# Patient Record
Sex: FEMALE | Race: WHITE | NOT HISPANIC OR LATINO | URBAN - METROPOLITAN AREA
[De-identification: names, ages, dates, MRNs, and addresses within clinical notes are randomized per-mention and may not be internally consistent; named-entity substitution may affect disease eponyms.]

---

## 2018-02-15 ENCOUNTER — EMERGENCY (EMERGENCY)
Facility: HOSPITAL | Age: 28
LOS: 1 days | Discharge: HOME | End: 2018-02-15
Attending: EMERGENCY MEDICINE | Admitting: EMERGENCY MEDICINE
Payer: COMMERCIAL

## 2018-02-15 VITALS
RESPIRATION RATE: 16 BRPM | HEART RATE: 73 BPM | SYSTOLIC BLOOD PRESSURE: 116 MMHG | OXYGEN SATURATION: 100 % | DIASTOLIC BLOOD PRESSURE: 77 MMHG

## 2018-02-15 VITALS
RESPIRATION RATE: 20 BRPM | WEIGHT: 140.21 LBS | SYSTOLIC BLOOD PRESSURE: 121 MMHG | HEART RATE: 68 BPM | DIASTOLIC BLOOD PRESSURE: 74 MMHG | TEMPERATURE: 99 F

## 2018-02-15 DIAGNOSIS — Z98.89 OTHER SPECIFIED POSTPROCEDURAL STATES: Chronic | ICD-10-CM

## 2018-02-15 PROCEDURE — 93005 ELECTROCARDIOGRAM TRACING: CPT

## 2018-02-15 PROCEDURE — 93010 ELECTROCARDIOGRAM REPORT: CPT

## 2018-02-15 PROCEDURE — 99283 EMERGENCY DEPT VISIT LOW MDM: CPT | Mod: 25

## 2018-02-15 PROCEDURE — 99284 EMERGENCY DEPT VISIT MOD MDM: CPT | Mod: 25

## 2018-02-15 NOTE — ED PROVIDER NOTE - PSYCHIATRIC, MLM
Alert and oriented to person, place, time/situation. Somewhat anxious. No apparent risk to self or others.

## 2018-02-15 NOTE — ED ADULT NURSE NOTE - OBJECTIVE STATEMENT
27y f pt c/o chest pain x 2 days; gradual onset; started while at rest; pt denies trauma; denies smoking; denies long trip; no birth control; pt states nothing makes it worse or better; comes and gos; starts with palpitations; pt states this happens when she gets anxious; pt has not been dx with anxiety but feels that she does get anxious and these symptoms start; pt denies family hx of early cardiac trouble; aox3; no resp distress; + chest pain to center of chest and then relieved by itself; no abd pain; no n/v/d; no fever/chills; no cough/congestion; pt states started exercising this month; no weight baring, running and yoga; skin warm dry intact; no periph edema; pt reports some sob; md harden at bedside

## 2018-02-15 NOTE — ED ADULT TRIAGE NOTE - CHIEF COMPLAINT QUOTE
Sent from urgent care for mid chest discomfort radiating to the back since last week. Pt has a h/o anxiety.

## 2018-02-15 NOTE — ED PROVIDER NOTE - MUSCULOSKELETAL, MLM
Spine appears normal, range of motion is not limited, no muscle or joint tenderness. No calf asymmetry, tenderness or palpable cord.

## 2018-02-15 NOTE — ED PROVIDER NOTE - MEDICAL DECISION MAKING DETAILS
Patient referred to the ED from the [facility] [place], [place] because of her c/o CP. Her complaint(s) are appreciated. Her EKG from the [facility] [place] [place] is unremarkable and our EKG reveals no dynamic change. Her physical examination is benign. She has no prior clot and does not take OCPs. There is no family history of premature cardiac death. There is nothing clinically evident to suggest any emergent process; i.e., ACS, Ao catastrophe, PTX, PE, complicated percy/myocarditis, etc. Not in need of any additional emergent diagnostic investigation or intervention at this time. Discharged with appropriate instruction and with a PCP with whom she can follow.

## 2018-02-15 NOTE — ED PROVIDER NOTE - OBJECTIVE STATEMENT
26 y/o female presenting with CP of 1 day's duration. It started gradually at rest, has been constant, midsternal, radiating to her back, described as a tightness, 8/10, without clear relieving or exacerbating factors and a/w palpitations. She also on ROSs questioning reports headache, dizziness, shortness of breath and numbness of her left arm.

## 2018-11-19 NOTE — ED ADULT NURSE NOTE - DOES PATIENT HAVE ADVANCE DIRECTIVE
Diagnosis: Non-small cell carcinoma of the lung-metastatic    Regimen: Carboplatin/Taxol  Cycle/Day: C1/D15    Dr. Allen Carlson is supervising clinician today.    ECO - Fully active, able to carry on all predisease activities without restrictions.    Nursing Assessment:   A focused nursing assessment addressing the toxicity of chemotherapy was performed and the patient reports the following:  Nausea: NO  Vomiting: NO  Fever: NO  Chills: NO  Other signs of infection: NO  Bleeding: NO  Mucositis: NO  Diarrhea: NO  Constipation: YES, pt reports small, hard stools.  Last BM yesterday.  States she started taking Miralax and has increased fluid intake.  Anorexia: NO  Dysuria: NO  Urinary Bleeding: NO  Cough: NO  Shortness of Breath: NO  Fatigue/Weakness: YES, generalized fatigue  Numbness/Tingling: NO  Other Neuropathies: NO  Edema: NO  Rash: NO  Hand/Foot Syndrome: NO  Anxiety/Depression/Insomnia: NO  Pain: NO    Patient confirms that she has received a copy of Chemotherapy Consent and verbalizes understanding of treatment plan: Yes.     Pre-Treatment: - Treatment consent signed  - Patient has valid pre-authorization  - VS completed  - BSA double checked  - Premed orders, including hydration, are verified prior to administration  - Treatment parameters verified in patient protocol  - Lab results checked - MD notified; labs within treatment parameters and appropriate for treatment today  - Chemotherapy doses independently doubled checked & verified by two practitioners  - Chemotherapy infusion pump settings are double checked & verified by two practitioners  - Patient is identified by first & last name, Date of birth that has been verified by two practitioners with the patient chairside.    Treatment: Refer to LDA and MAR for line assessment and medication administration  Refer to Toxicity Assessment doc flowsheet   Blood return confirmed before, during and after chemotherapy administered  Infusion pump used for  non-vesicant drugs     Central Line Type: Port  Central Line Site: Left  Port cleansed with ChloraPrep per protocol.  Accessed via: 20 gauge Britton needle  Patency Verification: Blood return greater or equal to 3 ml before, during and after treatment  Port flushed with: 20 ml 0.9 normal saline and 100 un/ml- 500 units heparin  Britton needle removed: Yes  Deaccess/site appearance: Clear (no redness, tenderness, or edema), dressing applied if required  Discharged: Stable  Comment: port accessed and labs drawn by phlebotomist at 1120 today      Post Treatment: Treatment tolerated well; no adverse reaction    Transfusion: Not needed    Integrative Medicine: No    Oral Chemotherapy: No    Education: No new instructions needed    Next appointment scheduled:   Future Appointments   Date Time Provider Department Center   11/26/2018 10:45 AM [place] [address] LAB [address]   11/26/2018 11:00 AM [facility]   12/3/2018 10:00 AM [facility] RADIATION THERAPY GENERIC RESOURCE [id number]   8/15/2019  8:30 AM Lakisha Stone MD [facility]     Patient instructed to call the office with any questions or concerns.    Patient Discharged: patient discharged to home per self, ambulatory     No

## 2019-08-30 ENCOUNTER — TRANSCRIPTION ENCOUNTER (OUTPATIENT)
Age: 29
End: 2019-08-30

## 2022-12-21 PROBLEM — G47.30 SLEEP APNEA, UNSPECIFIED: Chronic | Status: ACTIVE | Noted: 2018-02-15

## 2023-01-09 ENCOUNTER — APPOINTMENT (OUTPATIENT)
Dept: OBGYN | Facility: CLINIC | Age: 33
End: 2023-01-09
Payer: COMMERCIAL

## 2023-01-09 VITALS
HEART RATE: 82 BPM | OXYGEN SATURATION: 99 % | HEIGHT: 64 IN | DIASTOLIC BLOOD PRESSURE: 73 MMHG | RESPIRATION RATE: 16 BRPM | SYSTOLIC BLOOD PRESSURE: 109 MMHG | BODY MASS INDEX: 25.27 KG/M2 | WEIGHT: 148 LBS

## 2023-01-09 DIAGNOSIS — Z11.59 ENCOUNTER FOR SCREENING FOR OTHER VIRAL DISEASES: ICD-10-CM

## 2023-01-09 DIAGNOSIS — Z01.84 ENCOUNTER FOR ANTIBODY RESPONSE EXAMINATION: ICD-10-CM

## 2023-01-09 DIAGNOSIS — E55.9 VITAMIN D DEFICIENCY, UNSPECIFIED: ICD-10-CM

## 2023-01-09 DIAGNOSIS — Z13.88 ENCOUNTER FOR SCREENING FOR DISORDER DUE TO EXPOSURE TO CONTAMINANTS: ICD-10-CM

## 2023-01-09 DIAGNOSIS — N91.1 SECONDARY AMENORRHEA: ICD-10-CM

## 2023-01-09 DIAGNOSIS — R11.0 NAUSEA: ICD-10-CM

## 2023-01-09 DIAGNOSIS — Z13.29 ENCOUNTER FOR SCREENING FOR OTHER SUSPECTED ENDOCRINE DISORDER: ICD-10-CM

## 2023-01-09 DIAGNOSIS — D64.9 ANEMIA, UNSPECIFIED: ICD-10-CM

## 2023-01-09 DIAGNOSIS — Z11.3 ENCOUNTER FOR SCREENING FOR INFECTIONS WITH A PREDOMINANTLY SEXUAL MODE OF TRANSMISSION: ICD-10-CM

## 2023-01-09 DIAGNOSIS — Z01.83 ENCOUNTER FOR BLOOD TYPING: ICD-10-CM

## 2023-01-09 PROCEDURE — 36415 COLL VENOUS BLD VENIPUNCTURE: CPT

## 2023-01-09 PROCEDURE — 76817 TRANSVAGINAL US OBSTETRIC: CPT

## 2023-01-09 PROCEDURE — 99214 OFFICE O/P EST MOD 30 MIN: CPT

## 2023-01-09 NOTE — PLAN
[FreeTextEntry1] : TV sono with +IUP, CRL 6.6 weeks, +FH (1 week earlier than dates which would make EDC 8/27/23), c/w pt's 35 day cycle.\par -LOC simple noted approx 2.5cm\par -ob panel/UCx/pap/HPV/GC/Carrier screen today\par -Hx of C/S 7 years ago (different FOB)\par -Desires repeat C/S\par -pregnancy basics discussed, questions answered.\par \par RTO 3-4 weeks for NOB\par \par

## 2023-01-09 NOTE — HISTORY OF PRESENT ILLNESS
[FreeTextEntry1] : 33yo  presents for establishment of care, LMP 22 c/o amenorrhea, desires pregnancy. + nausea.\par \par Pt. is RN Med Surg, [employer] pt. transport [facility]\par \par AKADELINA Arnold in old emr\par \par Allergies: Eggplant\par \par Current Meds: none\par \par GYN: \par -accepts blood products\par -age of 1st intercourse 17\par -Male partner\par -s/p gardasil series\par -Hx of ovarian cysts\par -regular cycles 35 days x 1 week\par \par OB Hx\par -TOP x 1 \par -C/S 2015 42 weeks\par \par Med Hx: none\par \par Surgical Hx:\par -C/S x 1\par \par Family Hx: none\par \par Social Hx: -tobacco, -red drugs, -alcohol\par \par  [PapSmeardate] : 6/2020 [TextBox_31] : wnl

## 2023-01-11 LAB
ABO + RH PNL BLD: NORMAL
BACTERIA UR CULT: ABNORMAL
BASOPHILS # BLD AUTO: 0.03 K/UL
BASOPHILS NFR BLD AUTO: 0.3 %
BLD GP AB SCN SERPL QL: NORMAL
C TRACH RRNA SPEC QL NAA+PROBE: NOT DETECTED
EOSINOPHIL # BLD AUTO: 0.03 K/UL
EOSINOPHIL NFR BLD AUTO: 0.3 %
ESTIMATED AVERAGE GLUCOSE: 91 MG/DL
HBA1C MFR BLD HPLC: 4.8 %
HBV SURFACE AG SER QL: NONREACTIVE
HCT VFR BLD CALC: 38.3 %
HCV AB SER QL: NONREACTIVE
HCV S/CO RATIO: 0.08 S/CO
HGB A MFR BLD: 96.9 %
HGB A2 MFR BLD: 2.5 %
HGB BLD-MCNC: 13 G/DL
HGB F MFR BLD: 0.6 %
HGB FRACT BLD-IMP: NORMAL
HIV1+2 AB SPEC QL IA.RAPID: NONREACTIVE
HPV HIGH+LOW RISK DNA PNL CVX: NOT DETECTED
IMM GRANULOCYTES NFR BLD AUTO: 0.3 %
LEAD BLD-MCNC: <1 UG/DL
LYMPHOCYTES # BLD AUTO: 1.4 K/UL
LYMPHOCYTES NFR BLD AUTO: 14.2 %
MAN DIFF?: NORMAL
MCHC RBC-ENTMCNC: 30.8 PG
MCHC RBC-ENTMCNC: 33.9 GM/DL
MCV RBC AUTO: 90.8 FL
MEV IGG FLD QL IA: 43.9 AU/ML
MEV IGG+IGM SER-IMP: POSITIVE
MONOCYTES # BLD AUTO: 0.57 K/UL
MONOCYTES NFR BLD AUTO: 5.8 %
MUV AB SER-ACNC: POSITIVE
MUV IGG SER QL IA: 92.9 AU/ML
N GONORRHOEA RRNA SPEC QL NAA+PROBE: NOT DETECTED
NEUTROPHILS # BLD AUTO: 7.77 K/UL
NEUTROPHILS NFR BLD AUTO: 79.1 %
PLATELET # BLD AUTO: 237 K/UL
RBC # BLD: 4.22 M/UL
RBC # FLD: 12.2 %
RUBV IGG FLD-ACNC: 2 INDEX
RUBV IGG SER-IMP: POSITIVE
SOURCE AMPLIFICATION: NORMAL
T PALLIDUM AB SER QL IA: NEGATIVE
TSH SERPL-ACNC: 1.17 UIU/ML
VZV AB TITR SER: NEGATIVE
VZV IGG SER IF-ACNC: 56.2 INDEX
WBC # FLD AUTO: 9.83 K/UL

## 2023-01-11 RX ORDER — AMPICILLIN 500 MG/1
500 CAPSULE ORAL
Qty: 40 | Refills: 0 | Status: ACTIVE | COMMUNITY
Start: 2023-01-11 | End: 1900-01-01

## 2023-01-12 ENCOUNTER — NON-APPOINTMENT (OUTPATIENT)
Age: 33
End: 2023-01-12

## 2023-01-14 LAB
B19V IGG SER QL IA: 8.82 INDEX
B19V IGG+IGM SER-IMP: NORMAL
B19V IGG+IGM SER-IMP: POSITIVE
B19V IGM FLD-ACNC: 0.22 INDEX
B19V IGM SER-ACNC: NEGATIVE
CYTOLOGY CVX/VAG DOC THIN PREP: ABNORMAL

## 2023-02-01 ENCOUNTER — APPOINTMENT (OUTPATIENT)
Dept: OBGYN | Facility: CLINIC | Age: 33
End: 2023-02-01
Payer: COMMERCIAL

## 2023-02-01 PROCEDURE — 0500F INITIAL PRENATAL CARE VISIT: CPT

## 2023-02-01 PROCEDURE — 76817 TRANSVAGINAL US OBSTETRIC: CPT

## 2023-02-15 ENCOUNTER — TRANSCRIPTION ENCOUNTER (OUTPATIENT)
Age: 33
End: 2023-02-15

## 2023-02-17 ENCOUNTER — NON-APPOINTMENT (OUTPATIENT)
Age: 33
End: 2023-02-17

## 2023-02-17 ENCOUNTER — APPOINTMENT (OUTPATIENT)
Dept: OBGYN | Facility: CLINIC | Age: 33
End: 2023-02-17
Payer: COMMERCIAL

## 2023-02-17 ENCOUNTER — ASOB RESULT (OUTPATIENT)
Age: 33
End: 2023-02-17

## 2023-02-17 PROCEDURE — 76813 OB US NUCHAL MEAS 1 GEST: CPT

## 2023-02-17 PROCEDURE — 0502F SUBSEQUENT PRENATAL CARE: CPT

## 2023-02-17 PROCEDURE — 76801 OB US < 14 WKS SINGLE FETUS: CPT

## 2023-03-06 ENCOUNTER — TRANSCRIPTION ENCOUNTER (OUTPATIENT)
Age: 33
End: 2023-03-06

## 2023-03-17 ENCOUNTER — NON-APPOINTMENT (OUTPATIENT)
Age: 33
End: 2023-03-17

## 2023-03-17 ENCOUNTER — APPOINTMENT (OUTPATIENT)
Dept: OBGYN | Facility: CLINIC | Age: 33
End: 2023-03-17
Payer: COMMERCIAL

## 2023-03-17 PROCEDURE — 99213 OFFICE O/P EST LOW 20 MIN: CPT

## 2023-03-17 PROCEDURE — 0502F SUBSEQUENT PRENATAL CARE: CPT

## 2023-03-17 PROCEDURE — 36415 COLL VENOUS BLD VENIPUNCTURE: CPT

## 2023-03-20 ENCOUNTER — TRANSCRIPTION ENCOUNTER (OUTPATIENT)
Age: 33
End: 2023-03-20

## 2023-03-20 LAB
AFP MOM: 0.78
AFP VALUE: 28.3 NG/ML
ALPHA FETOPROTEIN SERUM COMMENT: NORMAL
ALPHA FETOPROTEIN SERUM INTERPRETATION: NORMAL
ALPHA FETOPROTEIN SERUM RESULTS: NORMAL
ALPHA FETOPROTEIN SERUM TEST RESULTS: NORMAL
GESTATIONAL AGE BASED ON: NORMAL
GESTATIONAL AGE ON COLLECTION DATE: 16.7 WEEKS
INSULIN DEP DIABETES: NO
MATERNAL AGE AT EDD AFP: 30.9 YR
MULTIPLE GESTATION: NO
OSBR RISK 1 IN: NORMAL
RACE: NORMAL
WEIGHT AFP: 161 LBS

## 2023-03-22 ENCOUNTER — NON-APPOINTMENT (OUTPATIENT)
Age: 33
End: 2023-03-22

## 2023-04-11 ENCOUNTER — APPOINTMENT (OUTPATIENT)
Dept: ANTEPARTUM | Facility: CLINIC | Age: 33
End: 2023-04-11
Payer: COMMERCIAL

## 2023-04-11 ENCOUNTER — ASOB RESULT (OUTPATIENT)
Age: 33
End: 2023-04-11

## 2023-04-11 ENCOUNTER — NON-APPOINTMENT (OUTPATIENT)
Age: 33
End: 2023-04-11

## 2023-04-11 PROCEDURE — 76811 OB US DETAILED SNGL FETUS: CPT

## 2023-04-18 ENCOUNTER — NON-APPOINTMENT (OUTPATIENT)
Age: 33
End: 2023-04-18

## 2023-04-18 ENCOUNTER — APPOINTMENT (OUTPATIENT)
Dept: OBGYN | Facility: CLINIC | Age: 33
End: 2023-04-18
Payer: COMMERCIAL

## 2023-04-18 VITALS
DIASTOLIC BLOOD PRESSURE: 72 MMHG | SYSTOLIC BLOOD PRESSURE: 110 MMHG | BODY MASS INDEX: 28.17 KG/M2 | HEIGHT: 64 IN | WEIGHT: 165 LBS

## 2023-04-18 DIAGNOSIS — R42 DIZZINESS AND GIDDINESS: ICD-10-CM

## 2023-04-18 PROCEDURE — 36415 COLL VENOUS BLD VENIPUNCTURE: CPT

## 2023-04-18 PROCEDURE — 0502F SUBSEQUENT PRENATAL CARE: CPT

## 2023-04-22 PROBLEM — R42 DIZZINESS: Status: ACTIVE | Noted: 2023-04-18

## 2023-04-22 LAB
ANION GAP SERPL CALC-SCNC: 15 MMOL/L
BACTERIA UR CULT: NORMAL
BUN SERPL-MCNC: 10 MG/DL
CALCIUM SERPL-MCNC: 8.9 MG/DL
CHLORIDE SERPL-SCNC: 103 MMOL/L
CO2 SERPL-SCNC: 22 MMOL/L
CREAT SERPL-MCNC: 0.65 MG/DL
EGFR: 121 ML/MIN/1.73M2
GLUCOSE SERPL-MCNC: 43 MG/DL
HCT VFR BLD CALC: 35.4 %
HGB BLD-MCNC: 10.9 G/DL
MCHC RBC-ENTMCNC: 30.7 PG
MCHC RBC-ENTMCNC: 30.8 GM/DL
MCV RBC AUTO: 99.7 FL
PLATELET # BLD AUTO: 261 K/UL
POTASSIUM SERPL-SCNC: 4.2 MMOL/L
RBC # BLD: 3.55 M/UL
RBC # FLD: 12.5 %
SODIUM SERPL-SCNC: 140 MMOL/L
WBC # FLD AUTO: 9.37 K/UL

## 2023-05-15 ENCOUNTER — OUTPATIENT (OUTPATIENT)
Dept: INPATIENT UNIT | Facility: HOSPITAL | Age: 33
LOS: 1 days | End: 2023-05-15
Payer: COMMERCIAL

## 2023-05-15 ENCOUNTER — NON-APPOINTMENT (OUTPATIENT)
Age: 33
End: 2023-05-15

## 2023-05-15 VITALS
DIASTOLIC BLOOD PRESSURE: 67 MMHG | HEART RATE: 86 BPM | SYSTOLIC BLOOD PRESSURE: 111 MMHG | TEMPERATURE: 98 F | RESPIRATION RATE: 18 BRPM

## 2023-05-15 VITALS — DIASTOLIC BLOOD PRESSURE: 61 MMHG | SYSTOLIC BLOOD PRESSURE: 107 MMHG | HEART RATE: 87 BPM

## 2023-05-15 DIAGNOSIS — O47.02 FALSE LABOR BEFORE 37 COMPLETED WEEKS OF GESTATION, SECOND TRIMESTER: ICD-10-CM

## 2023-05-15 DIAGNOSIS — Z98.89 OTHER SPECIFIED POSTPROCEDURAL STATES: Chronic | ICD-10-CM

## 2023-05-15 PROCEDURE — G0463: CPT

## 2023-05-15 PROCEDURE — 76817 TRANSVAGINAL US OBSTETRIC: CPT | Mod: 26

## 2023-05-15 PROCEDURE — 36415 COLL VENOUS BLD VENIPUNCTURE: CPT

## 2023-05-15 PROCEDURE — 76815 OB US LIMITED FETUS(S): CPT | Mod: 26

## 2023-05-15 PROCEDURE — 76817 TRANSVAGINAL US OBSTETRIC: CPT

## 2023-05-15 PROCEDURE — 87491 CHLMYD TRACH DNA AMP PROBE: CPT

## 2023-05-15 PROCEDURE — 59025 FETAL NON-STRESS TEST: CPT

## 2023-05-15 PROCEDURE — 87800 DETECT AGNT MULT DNA DIREC: CPT

## 2023-05-15 PROCEDURE — 87591 N.GONORRHOEAE DNA AMP PROB: CPT

## 2023-05-15 NOTE — OB PROVIDER TRIAGE NOTE - NSOBPROVIDERNOTE_OBGYN_ALL_OB_FT
A/P: RICHARD CUEVA is a 32y D1120L at 25w1d GA assessed for vaginal spotting with history of low lying placenta  - No vaginal bleeding noted on speculum exam  - On transvaginal US, placenta measuring 2.66cm for cervical os (resolved low lying placenta)  - Modified BPP: Gross fetal movement noted; MVP 7.9  - Patient has an appointment with OBGYN on Friday 5/19    Fetus: Reassuring for this GA  [place]: Irregular contractions  Dispo: Home with precautions. Patient was advised to return to the hospital for decreased fetal movement, vaginal bleeding, leakage of fluid, and/or contractions with increased intensity or frequency.     Discussed with Dr. Frazier

## 2023-05-15 NOTE — OB PROVIDER TRIAGE NOTE - NSHPPHYSICALEXAM_GEN_ALL_CORE
Vital Signs Last 24 Hrs    HR: 87 (15 May 2023 16:03) (87 - 87)  BP: 107/61 (15 May 2023 16:03) (104/59 - 107/61)    Gen: NAD, well-appearing, resting comfortably on room air  Abd: soft, gravid  Ext: non-edematous, non-tender   SVE: 0/0/-3  SSE: cervix visualized, closed and without any signs of bleeding or drainage, no pooling   Transvaginal sono: Cervical length 4.1  Modified BPP: Gross fetal movement noted; MVP 7.9  FHT: baseline 140, reassuring at this GA  [place]: Irregular contractions

## 2023-05-15 NOTE — OB RN TRIAGE NOTE - FALL HARM RISK - UNIVERSAL INTERVENTIONS
Bed in lowest position, wheels locked, appropriate side rails in place/Call bell, personal items and telephone in reach/Instruct patient to call for assistance before getting out of bed or chair/Non-slip footwear when patient is out of bed/[place] to call system/Physically safe environment - no spills, clutter or unnecessary equipment/Purposeful Proactive Rounding/Room/bathroom lighting operational, light cord in reach

## 2023-05-15 NOTE — OB PROVIDER TRIAGE NOTE - HISTORY OF PRESENT ILLNESS
RICHARD CUEVA is a 32y X3327H at 25w1d GA who presents to L&D for vaginal spotting with a history of a low lying placneta. She states that this morning upon urinating she had wiped and saw pink-tinged discharge on the toilet paper. She wiped later in the day and again noted pink tinged discharge. This is her 3rd episode of vaginal spotting during this pregnancy and has been closely monitored given her history of low lying placenta. She denies trauma and recent sexual intercourse. Pt denies leakage of fluid and reports intermittent contractions. She reports fetal movement. She denies fevers, chills, nausea, vomiting. She denies shortness of breath, chest pain, and palpitations. No other complaints at this time.    Pregnancy course is significant for:  Hx of low lying placenta  Anemia     POB:   G1: 2015 pCS in the setting of failed IOL  G2: 2018 eTOP  PGYN: -fibroids/+cysts, denied STD hx, denies abnormal PAPs  PMH: Anemia  PSH: Csx1  SH: Denies tobacco use, EtOH use and illicit drug use during the pregnancy; Feels safe at home  Meds: PNV  All: NKDA

## 2023-05-16 LAB
C TRACH RRNA SPEC QL NAA+PROBE: SIGNIFICANT CHANGE UP
CANDIDA AB TITR SER: DETECTED
G VAGINALIS DNA SPEC QL NAA+PROBE: DETECTED
N GONORRHOEA RRNA SPEC QL NAA+PROBE: SIGNIFICANT CHANGE UP
T VAGINALIS SPEC QL WET PREP: SIGNIFICANT CHANGE UP

## 2023-05-19 ENCOUNTER — NON-APPOINTMENT (OUTPATIENT)
Age: 33
End: 2023-05-19

## 2023-05-19 ENCOUNTER — APPOINTMENT (OUTPATIENT)
Dept: OBGYN | Facility: CLINIC | Age: 33
End: 2023-05-19
Payer: COMMERCIAL

## 2023-05-19 DIAGNOSIS — R42 DIZZINESS AND GIDDINESS: ICD-10-CM

## 2023-05-19 DIAGNOSIS — F32.A DEPRESSION, UNSPECIFIED: ICD-10-CM

## 2023-05-19 DIAGNOSIS — Z34.81 ENCOUNTER FOR SUPERVISION OF OTHER NORMAL PREGNANCY, FIRST TRIMESTER: ICD-10-CM

## 2023-05-19 PROCEDURE — 0502F SUBSEQUENT PRENATAL CARE: CPT

## 2023-05-22 PROBLEM — Z34.81: Status: RESOLVED | Noted: 2023-01-09 | Resolved: 2023-05-22

## 2023-05-22 PROBLEM — R42 VERTIGO, INTERMITTENT: Status: ACTIVE | Noted: 2023-05-19

## 2023-05-22 PROBLEM — F32.A DEPRESSION: Status: ACTIVE | Noted: 2023-04-22

## 2023-06-09 ENCOUNTER — LABORATORY RESULT (OUTPATIENT)
Age: 33
End: 2023-06-09

## 2023-06-09 ENCOUNTER — APPOINTMENT (OUTPATIENT)
Dept: OBGYN | Facility: CLINIC | Age: 33
End: 2023-06-09
Payer: COMMERCIAL

## 2023-06-09 PROCEDURE — 0502F SUBSEQUENT PRENATAL CARE: CPT

## 2023-06-09 PROCEDURE — 36415 COLL VENOUS BLD VENIPUNCTURE: CPT

## 2023-06-10 LAB
25(OH)D3 SERPL-MCNC: 27.3 NG/ML
GLUCOSE 1H P 50 G GLC PO SERPL-MCNC: 79 MG/DL
HCV AB SER QL: NONREACTIVE
HCV S/CO RATIO: 0.1 S/CO
HIV1+2 AB SPEC QL IA.RAPID: NONREACTIVE
T PALLIDUM AB SER QL IA: NEGATIVE

## 2023-06-27 ENCOUNTER — NON-APPOINTMENT (OUTPATIENT)
Age: 33
End: 2023-06-27

## 2023-06-27 ENCOUNTER — APPOINTMENT (OUTPATIENT)
Dept: OBGYN | Facility: CLINIC | Age: 33
End: 2023-06-27
Payer: COMMERCIAL

## 2023-06-27 ENCOUNTER — ASOB RESULT (OUTPATIENT)
Age: 33
End: 2023-06-27

## 2023-06-27 PROCEDURE — 0502F SUBSEQUENT PRENATAL CARE: CPT

## 2023-06-27 PROCEDURE — 76816 OB US FOLLOW-UP PER FETUS: CPT

## 2023-06-27 PROCEDURE — 76819 FETAL BIOPHYS PROFIL W/O NST: CPT | Mod: 59

## 2023-07-12 ENCOUNTER — NON-APPOINTMENT (OUTPATIENT)
Age: 33
End: 2023-07-12

## 2023-07-13 ENCOUNTER — APPOINTMENT (OUTPATIENT)
Dept: OBGYN | Facility: CLINIC | Age: 33
End: 2023-07-13
Payer: COMMERCIAL

## 2023-07-13 VITALS
SYSTOLIC BLOOD PRESSURE: 118 MMHG | DIASTOLIC BLOOD PRESSURE: 68 MMHG | HEIGHT: 64 IN | BODY MASS INDEX: 31.24 KG/M2 | WEIGHT: 183 LBS

## 2023-07-13 PROCEDURE — 0502F SUBSEQUENT PRENATAL CARE: CPT

## 2023-07-17 LAB
CANDIDA VAG CYTO: DETECTED
G VAGINALIS+PREV SP MTYP VAG QL MICRO: DETECTED
T VAGINALIS VAG QL WET PREP: NOT DETECTED

## 2023-07-17 RX ORDER — METRONIDAZOLE 7.5 MG/G
0.75 GEL VAGINAL
Qty: 1 | Refills: 0 | Status: ACTIVE | COMMUNITY
Start: 2023-07-17 | End: 1900-01-01

## 2023-07-17 RX ORDER — BUTOCONAZOLE NITRATE 100 MG/5G
2 CREAM VAGINAL DAILY
Qty: 1 | Refills: 0 | Status: ACTIVE | COMMUNITY
Start: 2023-07-17 | End: 1900-01-01

## 2023-07-25 ENCOUNTER — NON-APPOINTMENT (OUTPATIENT)
Age: 33
End: 2023-07-25

## 2023-07-26 ENCOUNTER — APPOINTMENT (OUTPATIENT)
Dept: OBGYN | Facility: CLINIC | Age: 33
End: 2023-07-26
Payer: COMMERCIAL

## 2023-07-26 PROCEDURE — 0502F SUBSEQUENT PRENATAL CARE: CPT

## 2023-08-02 ENCOUNTER — APPOINTMENT (OUTPATIENT)
Dept: OBGYN | Facility: CLINIC | Age: 33
End: 2023-08-02

## 2023-08-03 ENCOUNTER — APPOINTMENT (OUTPATIENT)
Dept: OBGYN | Facility: CLINIC | Age: 33
End: 2023-08-03
Payer: COMMERCIAL

## 2023-08-03 PROCEDURE — 0502F SUBSEQUENT PRENATAL CARE: CPT

## 2023-08-08 ENCOUNTER — OUTPATIENT (OUTPATIENT)
Dept: OUTPATIENT SERVICES | Facility: HOSPITAL | Age: 33
LOS: 1 days | End: 2023-08-08
Payer: COMMERCIAL

## 2023-08-08 VITALS
DIASTOLIC BLOOD PRESSURE: 71 MMHG | OXYGEN SATURATION: 97 % | RESPIRATION RATE: 18 BRPM | HEART RATE: 90 BPM | TEMPERATURE: 98 F | HEIGHT: 64 IN | WEIGHT: 190.04 LBS | SYSTOLIC BLOOD PRESSURE: 105 MMHG

## 2023-08-08 DIAGNOSIS — Z34.83 ENCOUNTER FOR SUPERVISION OF OTHER NORMAL PREGNANCY, THIRD TRIMESTER: ICD-10-CM

## 2023-08-08 DIAGNOSIS — Z98.891 HISTORY OF UTERINE SCAR FROM PREVIOUS SURGERY: Chronic | ICD-10-CM

## 2023-08-08 DIAGNOSIS — Z98.89 OTHER SPECIFIED POSTPROCEDURAL STATES: Chronic | ICD-10-CM

## 2023-08-08 DIAGNOSIS — O34.219 MATERNAL CARE FOR UNSPECIFIED TYPE SCAR FROM PREVIOUS CESAREAN DELIVERY: ICD-10-CM

## 2023-08-08 DIAGNOSIS — Z29.9 ENCOUNTER FOR PROPHYLACTIC MEASURES, UNSPECIFIED: ICD-10-CM

## 2023-08-08 DIAGNOSIS — Z01.818 ENCOUNTER FOR OTHER PREPROCEDURAL EXAMINATION: ICD-10-CM

## 2023-08-08 LAB
HCT VFR BLD CALC: 30.2 % — LOW (ref 34.5–45)
HGB BLD-MCNC: 9.3 G/DL — LOW (ref 11.5–15.5)
MCHC RBC-ENTMCNC: 26.5 PG — LOW (ref 27–34)
MCHC RBC-ENTMCNC: 30.8 GM/DL — LOW (ref 32–36)
MCV RBC AUTO: 86 FL — SIGNIFICANT CHANGE UP (ref 80–100)
NRBC # BLD: 0 /100 WBCS — SIGNIFICANT CHANGE UP (ref 0–0)
PLATELET # BLD AUTO: 307 K/UL — SIGNIFICANT CHANGE UP (ref 150–400)
RBC # BLD: 3.51 M/UL — LOW (ref 3.8–5.2)
RBC # FLD: 13.2 % — SIGNIFICANT CHANGE UP (ref 10.3–14.5)
WBC # BLD: 8.35 K/UL — SIGNIFICANT CHANGE UP (ref 3.8–10.5)
WBC # FLD AUTO: 8.35 K/UL — SIGNIFICANT CHANGE UP (ref 3.8–10.5)

## 2023-08-08 PROCEDURE — 86850 RBC ANTIBODY SCREEN: CPT

## 2023-08-08 PROCEDURE — G0463: CPT

## 2023-08-08 PROCEDURE — 86901 BLOOD TYPING SEROLOGIC RH(D): CPT

## 2023-08-08 PROCEDURE — 86900 BLOOD TYPING SEROLOGIC ABO: CPT

## 2023-08-08 RX ORDER — CEFAZOLIN SODIUM 1 G
2000 VIAL (EA) INJECTION ONCE
Refills: 0 | Status: COMPLETED | OUTPATIENT
Start: 2023-08-24 | End: 2023-08-24

## 2023-08-08 RX ORDER — CHLORHEXIDINE GLUCONATE 213 G/1000ML
1 SOLUTION TOPICAL ONCE
Refills: 0 | Status: COMPLETED | OUTPATIENT
Start: 2023-08-24 | End: 2023-08-24

## 2023-08-08 RX ORDER — SODIUM CHLORIDE 9 MG/ML
1000 INJECTION, SOLUTION INTRAVENOUS
Refills: 0 | Status: DISCONTINUED | OUTPATIENT
Start: 2023-08-24 | End: 2023-08-24

## 2023-08-08 RX ORDER — SODIUM CHLORIDE 9 MG/ML
1000 INJECTION, SOLUTION INTRAVENOUS ONCE
Refills: 0 | Status: COMPLETED | OUTPATIENT
Start: 2023-08-24 | End: 2023-08-24

## 2023-08-08 RX ORDER — CITRIC ACID/SODIUM CITRATE 300-500 MG
15 SOLUTION, ORAL ORAL ONCE
Refills: 0 | Status: COMPLETED | OUTPATIENT
Start: 2023-08-24 | End: 2023-08-24

## 2023-08-08 RX ORDER — FAMOTIDINE 10 MG/ML
20 INJECTION INTRAVENOUS ONCE
Refills: 0 | Status: COMPLETED | OUTPATIENT
Start: 2023-08-24 | End: 2023-08-24

## 2023-08-08 RX ORDER — OXYTOCIN 10 UNIT/ML
333.33 VIAL (ML) INJECTION
Qty: 20 | Refills: 0 | Status: COMPLETED | OUTPATIENT
Start: 2023-08-24 | End: 2023-08-24

## 2023-08-08 NOTE — OB PST NOTE - PROBLEM SELECTOR PLAN 1
Plan for repeat  on 23 with Dr. Thapa.   PST labs sent   Pre procedure surgical scrub instructions discussed

## 2023-08-08 NOTE — OB PST NOTE - ASSESSMENT
Loose teeth or dentures: Denies  Airway: MP2    CAPRINI SCORE [CLOT]    AGE RELATED RISK FACTORS                                                       MOBILITY RELATED FACTORS  [ ] Age 41-60 years                                            (1 Point)                  [ ] Bed rest                                                        (1 Point)  [ ] Age: 61-74 years                                           (2 Points)                 [ ] Plaster cast                                                   (2 Points)  [ ] Age= 75 years                                              (3 Points)                 [ ] Bed bound for more than 72 hours                 (2 Points)    DISEASE RELATED RISK FACTORS                                               GENDER SPECIFIC FACTORS  [ ] Edema in the lower extremities                       (1 Point)                  [ x] Pregnancy                                                     (1 Point)  [ ] Varicose veins                                               (1 Point)                  [ ] Post-partum < 6 weeks                                   (1 Point)             [x ] BMI > 25 Kg/m2                                            (1 Point)                  [ ] Hormonal therapy  or oral contraception          (1 Point)                 [ ] Sepsis (in the previous month)                        (1 Point)                  [ ] History of pregnancy complications                 (1 point)  [ ] Pneumonia or serious lung disease                                               [ ] Unexplained or recurrent                     (1 Point)           (in the previous month)                               (1 Point)  [ ] Abnormal pulmonary function test                     (1 Point)                 SURGERY RELATED RISK FACTORS  [ ] Acute myocardial infarction                              (1 Point)                 [x ]  Section                                             (1 Point)  [ ] Congestive heart failure (in the previous month)  (1 Point)               [ ] Minor surgery                                                  (1 Point)   [ ] Inflammatory bowel disease                             (1 Point)                 [ ] Arthroscopic surgery                                        (2 Points)  [ ] Central venous access                                      (2 Points)                [ ] General surgery lasting more than 45 minutes   (2 Points)       [ ] Stroke (in the previous month)                          (5 Points)               [ ] Elective arthroplasty                                         (5 Points)                                                                                                                                               HEMATOLOGY RELATED FACTORS                                                 TRAUMA RELATED RISK FACTORS  [ ] Prior episodes of VTE                                     (3 Points)                [ ] Fracture of the hip, pelvis, or leg                       (5 Points)  [ ] Positive family history for VTE                         (3 Points)                 [ ] Acute spinal cord injury (in the previous month)  (5 Points)  [ ] Prothrombin 59198 A                                     (3 Points)                 [ ] Paralysis  (less than 1 month)                             (5 Points)  [ ] Factor V Leiden                                             (3 Points)                  [ ] Multiple Trauma within 1 month                        (5 Points)  [ ] Lupus anticoagulants                                     (3 Points)                                                           [ ] Anticardiolipin antibodies                               (3 Points)                                                       [ ] High homocysteine in the blood                      (3 Points)                                             [ ] Other congenital or acquired thrombophilia      (3 Points)                                                [ ] Heparin induced thrombocytopenia                  (3 Points)                                          Total Score [   3       ]    Caprini Score 0 - 2:  Low Risk, No VTE Prophylaxis required for most patients, encourage ambulation  Caprini Score 3 - 6:  At Risk, pharmacologic VTE prophylaxis is indicated for most patients (in the absence of a contraindication)  Caprini Score Greater than or = 7:  High Risk, pharmacologic VTE prophylaxis is indicated for most patients (in the absence of a contraindication)

## 2023-08-08 NOTE — OB PST NOTE - NSICDXPASTMEDICALHX_GEN_ALL_CORE_FT
PAST MEDICAL HISTORY:  2019 novel coronavirus disease (COVID-19)     Bacterial vaginosis     Maternal care for scar from previous  delivery

## 2023-08-08 NOTE — OB PST NOTE - NS_OBGYNHISTORY_OBGYN_ALL_OB_FT
UTI in first trimester, treated with abx, BV in third trimester, treat with abx and Monistat.  Resolved,  no active infection.

## 2023-08-08 NOTE — OB PST NOTE - HISTORY OF PRESENT ILLNESS
Pt is a 31 yo , LMP 22, EDC 23. Pt with recent (1 week prior to PST visit) BV infection, treated with Monistat and PO metronidazole, Plan for repeat  on 23 with Dr. Thapa.

## 2023-08-11 ENCOUNTER — APPOINTMENT (OUTPATIENT)
Dept: OBGYN | Facility: CLINIC | Age: 33
End: 2023-08-11
Payer: COMMERCIAL

## 2023-08-11 ENCOUNTER — NON-APPOINTMENT (OUTPATIENT)
Age: 33
End: 2023-08-11

## 2023-08-11 DIAGNOSIS — Z34.82 ENCOUNTER FOR SUPERVISION OF OTHER NORMAL PREGNANCY, SECOND TRIMESTER: ICD-10-CM

## 2023-08-11 DIAGNOSIS — B37.9 CANDIDIASIS, UNSPECIFIED: ICD-10-CM

## 2023-08-11 DIAGNOSIS — N76.0 ACUTE VAGINITIS: ICD-10-CM

## 2023-08-11 DIAGNOSIS — B96.89 ACUTE VAGINITIS: ICD-10-CM

## 2023-08-11 PROCEDURE — 0502F SUBSEQUENT PRENATAL CARE: CPT

## 2023-08-14 LAB
CANDIDA VAG CYTO: NOT DETECTED
G VAGINALIS+PREV SP MTYP VAG QL MICRO: DETECTED
T VAGINALIS VAG QL WET PREP: NOT DETECTED

## 2023-08-14 RX ORDER — METRONIDAZOLE 500 MG/1
500 TABLET ORAL TWICE DAILY
Qty: 14 | Refills: 0 | Status: ACTIVE | COMMUNITY
Start: 2023-08-14 | End: 1900-01-01

## 2023-08-17 ENCOUNTER — NON-APPOINTMENT (OUTPATIENT)
Age: 33
End: 2023-08-17

## 2023-08-17 ENCOUNTER — APPOINTMENT (OUTPATIENT)
Dept: OBGYN | Facility: CLINIC | Age: 33
End: 2023-08-17
Payer: COMMERCIAL

## 2023-08-17 ENCOUNTER — ASOB RESULT (OUTPATIENT)
Age: 33
End: 2023-08-17

## 2023-08-17 PROCEDURE — 76816 OB US FOLLOW-UP PER FETUS: CPT

## 2023-08-17 PROCEDURE — 0502F SUBSEQUENT PRENATAL CARE: CPT

## 2023-08-17 PROCEDURE — 76818 FETAL BIOPHYS PROFILE W/NST: CPT

## 2023-08-24 ENCOUNTER — APPOINTMENT (OUTPATIENT)
Dept: OBGYN | Facility: HOSPITAL | Age: 33
End: 2023-08-24

## 2023-08-24 ENCOUNTER — INPATIENT (INPATIENT)
Facility: HOSPITAL | Age: 33
LOS: 1 days | Discharge: HOME | End: 2023-08-26
Attending: OBSTETRICS & GYNECOLOGY | Admitting: OBSTETRICS & GYNECOLOGY
Payer: COMMERCIAL

## 2023-08-24 VITALS — HEART RATE: 80 BPM | DIASTOLIC BLOOD PRESSURE: 59 MMHG | SYSTOLIC BLOOD PRESSURE: 116 MMHG

## 2023-08-24 DIAGNOSIS — Z98.89 OTHER SPECIFIED POSTPROCEDURAL STATES: Chronic | ICD-10-CM

## 2023-08-24 DIAGNOSIS — O34.219 MATERNAL CARE FOR UNSPECIFIED TYPE SCAR FROM PREVIOUS CESAREAN DELIVERY: ICD-10-CM

## 2023-08-24 DIAGNOSIS — Z34.83 ENCOUNTER FOR SUPERVISION OF OTHER NORMAL PREGNANCY, THIRD TRIMESTER: ICD-10-CM

## 2023-08-24 DIAGNOSIS — Z98.891 HISTORY OF UTERINE SCAR FROM PREVIOUS SURGERY: Chronic | ICD-10-CM

## 2023-08-24 LAB
BLD GP AB SCN SERPL QL: NEGATIVE — SIGNIFICANT CHANGE UP
COVID-19 SPIKE DOMAIN AB INTERP: POSITIVE
COVID-19 SPIKE DOMAIN ANTIBODY RESULT: >250 U/ML — HIGH
RH IG SCN BLD-IMP: POSITIVE — SIGNIFICANT CHANGE UP
SARS-COV-2 IGG+IGM SERPL QL IA: >250 U/ML — HIGH
SARS-COV-2 IGG+IGM SERPL QL IA: POSITIVE
T PALLIDUM AB TITR SER: NEGATIVE — SIGNIFICANT CHANGE UP

## 2023-08-24 PROCEDURE — 59510 CESAREAN DELIVERY: CPT

## 2023-08-24 DEVICE — SURGICEL FIBRILLAR 2 X 4": Type: IMPLANTABLE DEVICE | Status: FUNCTIONAL

## 2023-08-24 RX ORDER — KETOROLAC TROMETHAMINE 30 MG/ML
30 SYRINGE (ML) INJECTION EVERY 6 HOURS
Refills: 0 | Status: DISCONTINUED | OUTPATIENT
Start: 2023-08-24 | End: 2023-08-25

## 2023-08-24 RX ORDER — NALOXONE HYDROCHLORIDE 4 MG/.1ML
0.1 SPRAY NASAL
Refills: 0 | Status: DISCONTINUED | OUTPATIENT
Start: 2023-08-24 | End: 2023-08-26

## 2023-08-24 RX ORDER — NALBUPHINE HYDROCHLORIDE 10 MG/ML
2.5 INJECTION, SOLUTION INTRAMUSCULAR; INTRAVENOUS; SUBCUTANEOUS EVERY 6 HOURS
Refills: 0 | Status: DISCONTINUED | OUTPATIENT
Start: 2023-08-24 | End: 2023-08-26

## 2023-08-24 RX ORDER — DIPHENHYDRAMINE HCL 50 MG
25 CAPSULE ORAL EVERY 6 HOURS
Refills: 0 | Status: DISCONTINUED | OUTPATIENT
Start: 2023-08-24 | End: 2023-08-26

## 2023-08-24 RX ORDER — TETANUS TOXOID, REDUCED DIPHTHERIA TOXOID AND ACELLULAR PERTUSSIS VACCINE, ADSORBED 5; 2.5; 8; 8; 2.5 [IU]/.5ML; [IU]/.5ML; UG/.5ML; UG/.5ML; UG/.5ML
0.5 SUSPENSION INTRAMUSCULAR ONCE
Refills: 0 | Status: DISCONTINUED | OUTPATIENT
Start: 2023-08-24 | End: 2023-08-26

## 2023-08-24 RX ORDER — OXYCODONE HYDROCHLORIDE 5 MG/1
5 TABLET ORAL
Refills: 0 | Status: DISCONTINUED | OUTPATIENT
Start: 2023-08-24 | End: 2023-08-24

## 2023-08-24 RX ORDER — MORPHINE SULFATE 50 MG/1
0.1 CAPSULE, EXTENDED RELEASE ORAL ONCE
Refills: 0 | Status: DISCONTINUED | OUTPATIENT
Start: 2023-08-24 | End: 2023-08-25

## 2023-08-24 RX ORDER — OXYCODONE HYDROCHLORIDE 5 MG/1
5 TABLET ORAL ONCE
Refills: 0 | Status: DISCONTINUED | OUTPATIENT
Start: 2023-08-24 | End: 2023-08-26

## 2023-08-24 RX ORDER — SODIUM CHLORIDE 9 MG/ML
1000 INJECTION, SOLUTION INTRAVENOUS
Refills: 0 | Status: DISCONTINUED | OUTPATIENT
Start: 2023-08-24 | End: 2023-08-26

## 2023-08-24 RX ORDER — ONDANSETRON 8 MG/1
4 TABLET, FILM COATED ORAL EVERY 6 HOURS
Refills: 0 | Status: DISCONTINUED | OUTPATIENT
Start: 2023-08-24 | End: 2023-08-26

## 2023-08-24 RX ORDER — OXYCODONE HYDROCHLORIDE 5 MG/1
5 TABLET ORAL
Refills: 0 | Status: DISCONTINUED | OUTPATIENT
Start: 2023-08-24 | End: 2023-08-26

## 2023-08-24 RX ORDER — HEPARIN SODIUM 5000 [USP'U]/ML
5000 INJECTION INTRAVENOUS; SUBCUTANEOUS EVERY 12 HOURS
Refills: 0 | Status: DISCONTINUED | OUTPATIENT
Start: 2023-08-24 | End: 2023-08-26

## 2023-08-24 RX ORDER — DEXAMETHASONE 0.5 MG/5ML
4 ELIXIR ORAL EVERY 6 HOURS
Refills: 0 | Status: DISCONTINUED | OUTPATIENT
Start: 2023-08-24 | End: 2023-08-26

## 2023-08-24 RX ORDER — MAGNESIUM HYDROXIDE 400 MG/1
30 TABLET, CHEWABLE ORAL
Refills: 0 | Status: DISCONTINUED | OUTPATIENT
Start: 2023-08-24 | End: 2023-08-26

## 2023-08-24 RX ORDER — LANOLIN
1 OINTMENT (GRAM) TOPICAL EVERY 6 HOURS
Refills: 0 | Status: DISCONTINUED | OUTPATIENT
Start: 2023-08-24 | End: 2023-08-26

## 2023-08-24 RX ORDER — IBUPROFEN 200 MG
600 TABLET ORAL EVERY 6 HOURS
Refills: 0 | Status: COMPLETED | OUTPATIENT
Start: 2023-08-24 | End: 2024-07-22

## 2023-08-24 RX ORDER — ACETAMINOPHEN 500 MG
975 TABLET ORAL
Refills: 0 | Status: DISCONTINUED | OUTPATIENT
Start: 2023-08-24 | End: 2023-08-26

## 2023-08-24 RX ORDER — OXYTOCIN 10 UNIT/ML
333.33 VIAL (ML) INJECTION
Qty: 20 | Refills: 0 | Status: DISCONTINUED | OUTPATIENT
Start: 2023-08-24 | End: 2023-08-26

## 2023-08-24 RX ORDER — SIMETHICONE 80 MG/1
80 TABLET, CHEWABLE ORAL EVERY 4 HOURS
Refills: 0 | Status: DISCONTINUED | OUTPATIENT
Start: 2023-08-24 | End: 2023-08-26

## 2023-08-24 RX ADMIN — Medication 1000 MILLIUNIT(S)/MIN: at 13:35

## 2023-08-24 RX ADMIN — SODIUM CHLORIDE 2000 MILLILITER(S): 9 INJECTION, SOLUTION INTRAVENOUS at 10:28

## 2023-08-24 RX ADMIN — CHLORHEXIDINE GLUCONATE 1 APPLICATION(S): 213 SOLUTION TOPICAL at 10:29

## 2023-08-24 RX ADMIN — Medication 30 MILLIGRAM(S): at 23:55

## 2023-08-24 RX ADMIN — FAMOTIDINE 20 MILLIGRAM(S): 10 INJECTION INTRAVENOUS at 10:28

## 2023-08-24 RX ADMIN — Medication 1000 MILLIUNIT(S)/MIN: at 16:29

## 2023-08-24 RX ADMIN — Medication 30 MILLIGRAM(S): at 18:59

## 2023-08-24 RX ADMIN — HEPARIN SODIUM 5000 UNIT(S): 5000 INJECTION INTRAVENOUS; SUBCUTANEOUS at 18:59

## 2023-08-24 RX ADMIN — Medication 30 MILLIGRAM(S): at 23:06

## 2023-08-24 RX ADMIN — Medication 15 MILLILITER(S): at 10:28

## 2023-08-24 NOTE — OB PROVIDER DELIVERY SUMMARY - NSSELHIDDEN_OBGYN_ALL_OB_FT
[NS_DeliveryAttending1_OBGYN_ALL_OB_FT:[id number]==],[NS_DeliveryAssist1_OBGYN_ALL_OB_FT:[id number]]

## 2023-08-24 NOTE — OB RN INTRAOPERATIVE NOTE - NSSELHIDDEN_OBGYN_ALL_OB_FT
[NS_DeliveryAttending1_OBGYN_ALL_OB_FT:[id number]],[NS_DeliveryAssist1_OBGYN_ALL_OB_FT:[id number]],[NS_DeliveryRN_OBGYN_ALL_OB_FT:[id number]]

## 2023-08-24 NOTE — OB RN DELIVERY SUMMARY - NS_SEPSISRSKCALC_OBGYN_ALL_OB_FT
EOS calculated successfully. EOS Risk Factor: 0.5/1000 live births ([facility] national incidence); GA=39w4d; Temp=97.7; ROM=0.033; GBS='Unknown'; Antibiotics='No antibiotics or any antibiotics < 2 hrs prior to birth'

## 2023-08-24 NOTE — OB RN INTRAOPERATIVE NOTE - NS_ADD OB DELIVERY PROCEDURE_OBGYN_ALL_OB
1930 Pt received after report given from Rogelio Corea 118 Pt resting  In bed ambulating in the britt. 0230 Pt resting in bed  0559 [place] removed. Pt aware of DTV in 6 hours  Bedside and Verbal shift change report given to [id number] [facility] (oncoming nurse) by Marceilno Rosales RN (offgoing nurse). Report included the following information SBAR, Kardex and MAR. Click here

## 2023-08-24 NOTE — OB PROVIDER H&P - ATTENDING COMMENTS
I saw and evaluated Ms. Dang and agree with above.   Risks of  including but not limited to bleeding, infection, damage to surrounding structures and tissues including bowel and bladder were reviewed and all questions were answered. Informed consent was signed and placed on chart.   Mark HUSTON

## 2023-08-24 NOTE — OB PROVIDER H&P - NSHPPHYSICALEXAM_GEN_ALL_CORE
Gen: NAD  Head: NC/AT  Cardio: RRR  Abdomen: Gravid, NT/ND, BS+  Extremities: No LE edema bilaterally    FHR: HR 130s, moderate variability, accelerations present, no decelerations, Category 1.  [place]: irregular

## 2023-08-24 NOTE — OB RN PATIENT PROFILE - NS_CONSENTSAPP_OBGYN_ALL_OB
[de-identified] : No new imaging was obtained during today's visit. Prior obtained imaging was once again reviewed and is as noted below.\par \par Imaging done at [facility] on 02/27/20 reviewed on 02/28/2020 in office. There is no evidence of fracture. No evidence of periosteal reaction or healing callus formation. 
Yes

## 2023-08-24 NOTE — OB PROVIDER H&P - HISTORY OF PRESENT ILLNESS
Pt is a 33 yo , LMP 22, EDC 23, present for scheuled repeat . Pt with recent (1 week prior to PST visit) BV infection, treated with Monistat and PO metronidazole. Pt NPO since 11pm last night. Pt denies contraction, vaginal bleeding, leakage of fluid, and reports fetal movement.  Denies fever, chills, headaches, changes in vision, chest pain, palpitations, SOB, cough, nausea, vomiting, diarrhea, constipation, urinary symptoms, edema.    No complications throughout current pregnancy. No adverse reactions to anesthesia, no objections to blood transfusions if clinically indicated.    OBhx: C-secion 2019  heavy meconium & failure to dilate   GYN: no hx of fibroids, cysts, abnormal paps or STIs  PMH: No pertinent PMH  PSH: None   Meds: PNV  All: NKDA  Social Hx: Denies alcohol, tobacco, drug use    Vital Signs Last 24 Hrs  T(C): --  T(F): --  HR: 80 (24 Aug 2023 09:06) (80 - 80)  BP: 116/59 (24 Aug 2023 09:06) (116/59 - 116/59)  BP(mean): --  RR: --  SpO2: --

## 2023-08-24 NOTE — OB PROVIDER H&P - ASSESSMENT
"  Preventive Care at the 4 Month Visit  Growth Measurements & Percentiles  Head Circumference: 17\" (43.2 cm) (97 %, Source: WHO (Girls, 0-2 years)) 97 %ile based on WHO (Girls, 0-2 years) head circumference-for-age data using vitals from 2018.   Weight: 14 lbs 3 oz / 6.44 kg (actual weight) 46 %ile based on WHO (Girls, 0-2 years) weight-for-age data using vitals from 2018.   Length: 2' .803\" / 63 cm 59 %ile based on WHO (Girls, 0-2 years) length-for-age data using vitals from 2018.   Weight for length: 38 %ile based on WHO (Girls, 0-2 years) weight-for-recumbent length data using vitals from 2018.    Your baby s next Preventive Check-up will be at 6 months of age      Development    At this age, your baby may:    Raise her head high when lying on her stomach.    Raise her body on her hands when lying on her stomach.    Roll from her stomach to her back.    Play with her hands and hold a rattle.    Look at a mobile and move her hands.    Start social contact by smiling, cooing, laughing and squealing.    Cry when a parent moves out of sight.    Understand when a bottle is being prepared or getting ready to breastfeed and be able to wait for it for a short time.      Feeding Tips  Breast Milk    Nurse on demand     Check out the handout on Employed Breastfeeding Mother. https://www.lactationtraining.com/resources/educational-materials/handouts-parents/employed-breastfeeding-mother/download    Formula     Many babies feed 4 to 6 times per day, 6 to 8 oz at each feeding.    Don't prop the bottle.      Use a pacifier if the baby wants to suck.      Foods    It is often between 4-6 months that your baby will start watching you eat intently and then mouthing or grabbing for food. Follow her cues to start and stop eating.  Many people start by mixing rice cereal with breast milk or formula. Do not put cereal into a bottle.    To reduce your child's chance of developing peanut allergy, you can start " 33 y/o  admitted for scheduled repeat . Pt NPO since 11pm last night. No acute complains.     Plan   - NPO   - LR Bolus x 1L, LR@125cc/hr for maintenance.  - routine labs  - EFM/toco    Antonella Pizano PGY1 introducing peanut-containing foods in small amounts around 6 months of age.  If your child has severe eczema, egg allergy or both, consult with your doctor first about possible allergy-testing and introduction of small amounts of peanut-containing foods at 4-6 months old.   Stools    If you give your baby pureéd foods, her stools may be less firm, occur less often, have a strong odor or become a different color.      Sleep    About 80 percent of 4-month-old babies sleep at least five to six hours in a row at night.  If your baby doesn t, try putting her to bed while drowsy/tired but awake.  Give your baby the same safe toy or blanket.  This is called a  transition object.   Do not play with or have a lot of contact with your baby at nighttime.    Your baby does not need to be fed if she wakes up during the night more frequently than every 5-6 hours.        Safety    The car seat should be in the rear seat facing backwards until your child weighs more than 20 pounds and turns 2 years old.    Do not let anyone smoke around your baby (or in your house or car) at any time.    Never leave your baby alone, even for a few seconds.  Your baby may be able to roll over.  Take any safety precautions.    Keep baby powders,  and small objects out of the baby s reach at all times.    Do not use infant walkers.  They can cause serious accidents and serve no useful purpose.  A better choice is an stationary exersaucer.      What Your Baby Needs    Give your baby toys that she can shake or bang.  A toy that makes noise as it s moved increases your baby s awareness.  She will repeat that activity.    Sing rhythmic songs or nursery rhymes.    Your baby may drool a lot or put objects into her mouth.  Make sure your baby is safe from small or sharp objects.    Read to your baby every night.

## 2023-08-24 NOTE — OB RN DELIVERY SUMMARY - NSSELHIDDEN_OBGYN_ALL_OB_FT
[NS_DeliveryAttending1_OBGYN_ALL_OB_FT:[id number]],[NS_DeliveryAssist1_OBGYN_ALL_OB_FT:[id number]],[NS_DeliveryRN_OBGYN_ALL_OB_FT:[id number]]

## 2023-08-24 NOTE — OB PROVIDER DELIVERY SUMMARY - NSPROVIDERDELIVERYNOTE_OBGYN_ALL_OB_FT
repeat LTCS, uncomplicated  viable female infant, vertex presentation, weight 7# (3170g), Apgars 9/9, cord gasses sent  Grossly normal fallopian tubes, uterus, and ovaries  Fibrillar applied over the hysterotomy    QBL: 669  IVF: 1200  UOP: 400    Dictation#: [id number]    Mary Ann Felipe, PGY-2

## 2023-08-25 ENCOUNTER — NON-APPOINTMENT (OUTPATIENT)
Age: 33
End: 2023-08-25

## 2023-08-25 LAB
BASOPHILS # BLD AUTO: 0.02 K/UL — SIGNIFICANT CHANGE UP (ref 0–0.2)
BASOPHILS NFR BLD AUTO: 0.1 % — SIGNIFICANT CHANGE UP (ref 0–2)
EOSINOPHIL # BLD AUTO: 0.01 K/UL — SIGNIFICANT CHANGE UP (ref 0–0.5)
EOSINOPHIL NFR BLD AUTO: 0.1 % — SIGNIFICANT CHANGE UP (ref 0–6)
HCT VFR BLD CALC: 27.9 % — LOW (ref 34.5–45)
HGB BLD-MCNC: 8.5 G/DL — LOW (ref 11.5–15.5)
IMM GRANULOCYTES NFR BLD AUTO: 0.8 % — SIGNIFICANT CHANGE UP (ref 0–0.9)
LYMPHOCYTES # BLD AUTO: 1.99 K/UL — SIGNIFICANT CHANGE UP (ref 1–3.3)
LYMPHOCYTES # BLD AUTO: 13.8 % — SIGNIFICANT CHANGE UP (ref 13–44)
MCHC RBC-ENTMCNC: 25.9 PG — LOW (ref 27–34)
MCHC RBC-ENTMCNC: 30.5 GM/DL — LOW (ref 32–36)
MCV RBC AUTO: 85.1 FL — SIGNIFICANT CHANGE UP (ref 80–100)
MONOCYTES # BLD AUTO: 1.27 K/UL — HIGH (ref 0–0.9)
MONOCYTES NFR BLD AUTO: 8.8 % — SIGNIFICANT CHANGE UP (ref 2–14)
NEUTROPHILS # BLD AUTO: 11.03 K/UL — HIGH (ref 1.8–7.4)
NEUTROPHILS NFR BLD AUTO: 76.4 % — SIGNIFICANT CHANGE UP (ref 43–77)
NRBC # BLD: 0 /100 WBCS — SIGNIFICANT CHANGE UP (ref 0–0)
PLATELET # BLD AUTO: 321 K/UL — SIGNIFICANT CHANGE UP (ref 150–400)
RBC # BLD: 3.28 M/UL — LOW (ref 3.8–5.2)
RBC # FLD: 13.5 % — SIGNIFICANT CHANGE UP (ref 10.3–14.5)
WBC # BLD: 14.44 K/UL — HIGH (ref 3.8–10.5)
WBC # FLD AUTO: 14.44 K/UL — HIGH (ref 3.8–10.5)

## 2023-08-25 RX ORDER — IBUPROFEN 200 MG
600 TABLET ORAL EVERY 6 HOURS
Refills: 0 | Status: DISCONTINUED | OUTPATIENT
Start: 2023-08-25 | End: 2023-08-26

## 2023-08-25 RX ADMIN — Medication 975 MILLIGRAM(S): at 15:00

## 2023-08-25 RX ADMIN — Medication 600 MILLIGRAM(S): at 23:44

## 2023-08-25 RX ADMIN — Medication 975 MILLIGRAM(S): at 02:11

## 2023-08-25 RX ADMIN — Medication 600 MILLIGRAM(S): at 18:30

## 2023-08-25 RX ADMIN — Medication 975 MILLIGRAM(S): at 03:28

## 2023-08-25 RX ADMIN — Medication 975 MILLIGRAM(S): at 09:01

## 2023-08-25 RX ADMIN — Medication 975 MILLIGRAM(S): at 15:30

## 2023-08-25 RX ADMIN — Medication 975 MILLIGRAM(S): at 21:18

## 2023-08-25 RX ADMIN — HEPARIN SODIUM 5000 UNIT(S): 5000 INJECTION INTRAVENOUS; SUBCUTANEOUS at 18:01

## 2023-08-25 RX ADMIN — Medication 600 MILLIGRAM(S): at 18:00

## 2023-08-25 RX ADMIN — Medication 30 MILLIGRAM(S): at 07:02

## 2023-08-25 RX ADMIN — Medication 975 MILLIGRAM(S): at 20:48

## 2023-08-25 RX ADMIN — Medication 30 MILLIGRAM(S): at 05:53

## 2023-08-25 RX ADMIN — Medication 975 MILLIGRAM(S): at 09:31

## 2023-08-25 RX ADMIN — HEPARIN SODIUM 5000 UNIT(S): 5000 INJECTION INTRAVENOUS; SUBCUTANEOUS at 05:53

## 2023-08-25 NOTE — PROGRESS NOTE ADULT - ASSESSMENT
33y/o POD#1 s/p rLTCS. Vitals stable. No acute events overnight. Dressing removed, incision clean dry and intact.     #postpartum  - EBL: 669  - hct: 30.2->27.9  - continue with PO analgesia  - increase ambulation  - continue regular diet  - encourage incentive spirometry    Antonella Pizano PGY1

## 2023-08-26 ENCOUNTER — TRANSCRIPTION ENCOUNTER (OUTPATIENT)
Age: 33
End: 2023-08-26

## 2023-08-26 VITALS
RESPIRATION RATE: 18 BRPM | HEART RATE: 90 BPM | OXYGEN SATURATION: 97 % | SYSTOLIC BLOOD PRESSURE: 107 MMHG | DIASTOLIC BLOOD PRESSURE: 71 MMHG | TEMPERATURE: 98 F

## 2023-08-26 PROCEDURE — 86901 BLOOD TYPING SEROLOGIC RH(D): CPT

## 2023-08-26 PROCEDURE — 86769 SARS-COV-2 COVID-19 ANTIBODY: CPT

## 2023-08-26 PROCEDURE — 86780 TREPONEMA PALLIDUM: CPT

## 2023-08-26 PROCEDURE — 59025 FETAL NON-STRESS TEST: CPT

## 2023-08-26 PROCEDURE — C1889: CPT

## 2023-08-26 PROCEDURE — 86900 BLOOD TYPING SEROLOGIC ABO: CPT

## 2023-08-26 PROCEDURE — 86850 RBC ANTIBODY SCREEN: CPT

## 2023-08-26 PROCEDURE — 59050 FETAL MONITOR W/REPORT: CPT

## 2023-08-26 PROCEDURE — 85025 COMPLETE CBC W/AUTO DIFF WBC: CPT

## 2023-08-26 RX ORDER — IBUPROFEN 200 MG
1 TABLET ORAL
Qty: 0 | Refills: 0 | DISCHARGE
Start: 2023-08-26

## 2023-08-26 RX ORDER — ACETAMINOPHEN 500 MG
3 TABLET ORAL
Qty: 0 | Refills: 0 | DISCHARGE
Start: 2023-08-26

## 2023-08-26 RX ADMIN — Medication 600 MILLIGRAM(S): at 12:50

## 2023-08-26 RX ADMIN — Medication 600 MILLIGRAM(S): at 12:28

## 2023-08-26 RX ADMIN — Medication 975 MILLIGRAM(S): at 09:30

## 2023-08-26 RX ADMIN — Medication 975 MILLIGRAM(S): at 02:43

## 2023-08-26 RX ADMIN — Medication 600 MILLIGRAM(S): at 00:14

## 2023-08-26 RX ADMIN — Medication 975 MILLIGRAM(S): at 03:13

## 2023-08-26 RX ADMIN — Medication 975 MILLIGRAM(S): at 08:52

## 2023-08-26 RX ADMIN — Medication 600 MILLIGRAM(S): at 06:20

## 2023-08-26 RX ADMIN — Medication 600 MILLIGRAM(S): at 05:50

## 2023-08-26 NOTE — DISCHARGE NOTE OB - CARE PLAN
1 Principal Discharge DX:	Maternal care for scar from previous  delivery  Assessment and plan of treatment:	Regular diet as tolerated, regular activity as tolerated, nothing per vagina, no heavy lifting

## 2023-08-26 NOTE — PROGRESS NOTE ADULT - ATTENDING COMMENTS
33y/o POD#1 s/p rLTCS  routine post op and postpartum care  discharge planning POD#2    Sabina Kelly MD
Attending Note    Doing well, meeting all milestones  discharge planning    Eva Wood MD

## 2023-08-26 NOTE — DISCHARGE NOTE OB - CARE PROVIDER_API CALL
Karen Thapa  Obstetrics and Gynecology  [address] [place] [address], [address]  [place] [address]  Phone: [phone]  Fax: [phone]  Follow Up Time:

## 2023-08-26 NOTE — PROGRESS NOTE ADULT - SUBJECTIVE AND OBJECTIVE BOX
Day 1 of Anesthesia Pain Management Service    SUBJECTIVE:  Pain Scale Score:          [X] Refer to charted pain scores    THERAPY:    s/p _0.1__mg PF morphine    MEDICATIONS  (STANDING):  acetaminophen     Tablet .. 975 milliGRAM(s) Oral <User Schedule>  diphtheria/tetanus/pertussis (acellular) Vaccine (Adacel) 0.5 milliLiter(s) IntraMuscular once  heparin   Injectable 5000 Unit(s) SubCutaneous every 12 hours  ibuprofen  Tablet. 600 milliGRAM(s) Oral every 6 hours  ketorolac   Injectable 30 milliGRAM(s) IV Push every 6 hours  lactated ringers. 1000 milliLiter(s) (125 mL/Hr) IV Continuous <Continuous>  morphine PF Spinal 0.1 milliGRAM(s) IntraThecal. once  oxytocin Infusion 333.333 milliUNIT(s)/Min (1000 mL/Hr) IV Continuous <Continuous>    MEDICATIONS  (PRN):  dexAMETHasone  Injectable 4 milliGRAM(s) IV Push every 6 hours PRN Nausea  diphenhydrAMINE 25 milliGRAM(s) Oral every 6 hours PRN Pruritus  lanolin Ointment 1 Application(s) Topical every 6 hours PRN Sore Nipples  magnesium hydroxide Suspension 30 milliLiter(s) Oral two times a day PRN Constipation  nalbuphine Injectable 2.5 milliGRAM(s) IV Push every 6 hours PRN Pruritus  naloxone Injectable 0.1 milliGRAM(s) IV Push every 3 minutes PRN For ANY of the following changes in patient status:  A. Breaths Per Minute LESS THAN 10, B. Oxygen saturation LESS THAN 90%, C. Sedation score of 6 for Stop After: 4 Times  ondansetron Injectable 4 milliGRAM(s) IV Push every 6 hours PRN Nausea  oxyCODONE    IR 5 milliGRAM(s) Oral every 3 hours PRN Moderate to Severe Pain (4-10)  oxyCODONE    IR 5 milliGRAM(s) Oral every 3 hours PRN Mild Pain (1 - 3)  oxyCODONE    IR 5 milliGRAM(s) Oral once PRN Moderate to Severe Pain (4-10)  simethicone 80 milliGRAM(s) Chew every 4 hours PRN Gas      OBJECTIVE:    Sedation:        	[X] Alert	[ ] Drowsy	[ ] Arousable      [ ] Asleep       [ ] Unresponsive    Side Effects:	[X] None	[ ] Nausea	[ ] Vomiting         [ ] Pruritus  		[ ] Weakness            [ ] Numbness	          [ ] Other:    Vital Signs Last 24 Hrs  T(C): 37.1 (25 Aug 2023 08:39), Max: 37.1 (25 Aug 2023 08:39)  T(F): 98.8 (25 Aug 2023 08:39), Max: 98.8 (25 Aug 2023 08:39)  HR: 66 (25 Aug 2023 08:39) (66 - 90)  BP: 94/62 (25 Aug 2023 08:39) (93/57 - 116/59)  BP(mean): 62 (25 Aug 2023 08:39) (62 - 77)  RR: 18 (25 Aug 2023 08:39) (16 - 22)  SpO2: 97% (25 Aug 2023 08:39) (88% - 100%)    Parameters below as of 25 Aug 2023 08:45  Patient On (Oxygen Delivery Method): room air        ASSESSMENT/ PLAN  [X] Patient transitioned to prn analgesics  [X] Pain management per primary service, pain service to sign off   [X]Documentation and Verification of current medications
Pt is a 31y/o POD#1 s/p rLTCS. Patient seen and examined at bedside. No acute complaints, pain well controlled. Patient is ambulating and tolerating regular diet. Has  passed flatus. Voiding spontaneously . Denies CP, SOB, N/V, HA, blurred vision, epigastric pain.    Vital Signs Last 24 Hours  T(C): 36.9 (08-25-23 @ 05:26), Max: 36.9 (08-24-23 @ 18:23)  HR: 81 (08-25-23 @ 05:26) (68 - 90)  BP: 100/68 (08-25-23 @ 05:26) (93/57 - 116/59)  RR: 18 (08-25-23 @ 05:26) (16 - 22)  SpO2: 99% (08-25-23 @ 05:26) (88% - 100%)    I&O's Summary    24 Aug 2023 07:01  -  25 Aug 2023 07:00  --------------------------------------------------------  IN: 0 mL / OUT: 1769 mL / NET: -1769 mL        Physical Exam:  General: NAD  Abdomen: Soft, expected tenderness, non-distended, fundus firm  Incision: Pfannenstiel incision CDI  Pelvic: Lochia wnl    Labs:    Blood Type: B Positive  Antibody Screen: Negative  RPR: Negative               8.5    14.44 )-----------( 321      ( 08-25 @ 06:57 )             27.9                9.3    8.35  )-----------( 307      ( 08-08 @ 09:18 )             30.2         MEDICATIONS  (STANDING):  acetaminophen     Tablet .. 975 milliGRAM(s) Oral <User Schedule>  diphtheria/tetanus/pertussis (acellular) Vaccine (Adacel) 0.5 milliLiter(s) IntraMuscular once  heparin   Injectable 5000 Unit(s) SubCutaneous every 12 hours  ibuprofen  Tablet. 600 milliGRAM(s) Oral every 6 hours  ketorolac   Injectable 30 milliGRAM(s) IV Push every 6 hours  lactated ringers. 1000 milliLiter(s) (125 mL/Hr) IV Continuous <Continuous>  morphine PF Spinal 0.1 milliGRAM(s) IntraThecal. once  oxytocin Infusion 333.333 milliUNIT(s)/Min (1000 mL/Hr) IV Continuous <Continuous>    MEDICATIONS  (PRN):  dexAMETHasone  Injectable 4 milliGRAM(s) IV Push every 6 hours PRN Nausea  diphenhydrAMINE 25 milliGRAM(s) Oral every 6 hours PRN Pruritus  lanolin Ointment 1 Application(s) Topical every 6 hours PRN Sore Nipples  magnesium hydroxide Suspension 30 milliLiter(s) Oral two times a day PRN Constipation  nalbuphine Injectable 2.5 milliGRAM(s) IV Push every 6 hours PRN Pruritus  naloxone Injectable 0.1 milliGRAM(s) IV Push every 3 minutes PRN For ANY of the following changes in patient status:  A. Breaths Per Minute LESS THAN 10, B. Oxygen saturation LESS THAN 90%, C. Sedation score of 6 for Stop After: 4 Times  ondansetron Injectable 4 milliGRAM(s) IV Push every 6 hours PRN Nausea  oxyCODONE    IR 5 milliGRAM(s) Oral every 3 hours PRN Moderate to Severe Pain (4-10)  oxyCODONE    IR 5 milliGRAM(s) Oral every 3 hours PRN Mild Pain (1 - 3)  oxyCODONE    IR 5 milliGRAM(s) Oral once PRN Moderate to Severe Pain (4-10)  simethicone 80 milliGRAM(s) Chew every 4 hours PRN Gas      
R1 Progress Note    Patient seen and examined at bedside, no acute overnight events. No acute complaints, pain well controlled. Patient is ambulating, voiding, and tolerating regular diet. Passing flatus. Denies CP, SOB, N/V, HA, blurred vision, epigastric pain.    Vital Signs Last 24 Hours  T(C): 36.8 (08-26-23 @ 05:09), Max: 37.2 (08-25-23 @ 21:00)  HR: 90 (08-26-23 @ 05:09) (66 - 97)  BP: 107/71 (08-26-23 @ 05:09) (92/58 - 109/70)  RR: 18 (08-26-23 @ 05:09) (18 - 18)  SpO2: 97% (08-26-23 @ 05:09) (97% - 100%)    I&O's Summary      Physical Exam:  General: NAD  Abdomen: Soft, non-tender, non-distended, fundus firm  Incision: Pfannenstiel incision CDI, subcuticular suture closure  Pelvic: Lochia wnl    Labs:    Blood Type: B Positive  Antibody Screen: Negative  RPR: Negative               8.5    14.44 )-----------( 321      ( 08-25 @ 06:57 )             27.9                9.3    8.35  )-----------( 307      ( 08-08 @ 09:18 )             30.2         MEDICATIONS  (STANDING):  acetaminophen     Tablet .. 975 milliGRAM(s) Oral <User Schedule>  diphtheria/tetanus/pertussis (acellular) Vaccine (Adacel) 0.5 milliLiter(s) IntraMuscular once  heparin   Injectable 5000 Unit(s) SubCutaneous every 12 hours  ibuprofen  Tablet. 600 milliGRAM(s) Oral every 6 hours  lactated ringers. 1000 milliLiter(s) (125 mL/Hr) IV Continuous <Continuous>  oxytocin Infusion 333.333 milliUNIT(s)/Min (1000 mL/Hr) IV Continuous <Continuous>    MEDICATIONS  (PRN):  dexAMETHasone  Injectable 4 milliGRAM(s) IV Push every 6 hours PRN Nausea  diphenhydrAMINE 25 milliGRAM(s) Oral every 6 hours PRN Pruritus  lanolin Ointment 1 Application(s) Topical every 6 hours PRN Sore Nipples  magnesium hydroxide Suspension 30 milliLiter(s) Oral two times a day PRN Constipation  nalbuphine Injectable 2.5 milliGRAM(s) IV Push every 6 hours PRN Pruritus  naloxone Injectable 0.1 milliGRAM(s) IV Push every 3 minutes PRN For ANY of the following changes in patient status:  A. Breaths Per Minute LESS THAN 10, B. Oxygen saturation LESS THAN 90%, C. Sedation score of 6 for Stop After: 4 Times  ondansetron Injectable 4 milliGRAM(s) IV Push every 6 hours PRN Nausea  oxyCODONE    IR 5 milliGRAM(s) Oral every 3 hours PRN Moderate to Severe Pain (4-10)  oxyCODONE    IR 5 milliGRAM(s) Oral once PRN Moderate to Severe Pain (4-10)  simethicone 80 milliGRAM(s) Chew every 4 hours PRN Gas

## 2023-08-26 NOTE — PROGRESS NOTE ADULT - ASSESSMENT
33y/o G_P_ POD#_ from _ for _ complicated by_. PMH significant for _. H/H_. Current issues include _. Patient is hemodynamically stable and progressing well post-op.    #Postpartum state  - Continue with po analgesia  - Increase ambulation  - Continue regular diet  - DVT prophylaxis with 5000u heparin  - Dispo planning    Sherin Rollins  PGY-1 31y/o  POD#2 from uncomplicated rLTCS. Patient is hemodynamically stable and progressing well post-op.    #Postpartum state  - Continue with po analgesia  - Increase ambulation  - Continue regular diet  - DVT prophylaxis with 5000u heparin  - Dispo planning    Sherin Rollins  PGY-1

## 2023-08-26 NOTE — DISCHARGE NOTE OB - HOSPITAL COURSE
1.58 status post Patient is status post low transverse  delivery, uncomplicated post operative course.  Upon discharge, patient is spontaneously voiding, tolerating a regular diet, out of bed, and reports adequate pain control

## 2023-08-26 NOTE — DISCHARGE NOTE OB - PATIENT PORTAL LINK FT
You can access the FollowMyHealth Patient Portal offered by [facility] by registering at the following website: [email or web address]. By joining [employer]’s FollowMyHealth portal, you will also be able to view your health information using other applications (apps) compatible with our system.

## 2023-09-14 ENCOUNTER — APPOINTMENT (OUTPATIENT)
Dept: OBGYN | Facility: CLINIC | Age: 33
End: 2023-09-14
Payer: COMMERCIAL

## 2023-09-14 VITALS
BODY MASS INDEX: 30.39 KG/M2 | WEIGHT: 178 LBS | SYSTOLIC BLOOD PRESSURE: 116 MMHG | DIASTOLIC BLOOD PRESSURE: 73 MMHG | HEART RATE: 69 BPM | OXYGEN SATURATION: 97 % | HEIGHT: 64 IN

## 2023-09-14 DIAGNOSIS — Z34.93 ENCOUNTER FOR SUPERVISION OF NORMAL PREGNANCY, UNSPECIFIED, THIRD TRIMESTER: ICD-10-CM

## 2023-09-14 DIAGNOSIS — O34.219 MATERNAL CARE FOR UNSPECIFIED TYPE SCAR FROM PREVIOUS CESAREAN DELIVERY: ICD-10-CM

## 2023-09-14 DIAGNOSIS — R39.9 UNSPECIFIED SYMPTOMS AND SIGNS INVOLVING THE GENITOURINARY SYSTEM: ICD-10-CM

## 2023-09-14 PROBLEM — U07.1 COVID-19: Chronic | Status: ACTIVE | Noted: 2023-08-08

## 2023-09-14 PROBLEM — N76.0 ACUTE VAGINITIS: Chronic | Status: ACTIVE | Noted: 2023-08-08

## 2023-09-14 PROCEDURE — 0503F POSTPARTUM CARE VISIT: CPT

## 2023-10-04 ENCOUNTER — APPOINTMENT (OUTPATIENT)
Dept: OBGYN | Facility: CLINIC | Age: 33
End: 2023-10-04
Payer: COMMERCIAL

## 2023-10-04 VITALS
BODY MASS INDEX: 28.68 KG/M2 | HEIGHT: 64 IN | WEIGHT: 168 LBS | SYSTOLIC BLOOD PRESSURE: 107 MMHG | DIASTOLIC BLOOD PRESSURE: 75 MMHG

## 2023-10-04 PROCEDURE — 0503F POSTPARTUM CARE VISIT: CPT

## 2024-05-08 NOTE — OB PROVIDER H&P - NS_VBACATTEMPT_OBGYN_ALL_OB
Patient continues to be followed by interdisciplinary Oncology Symptom Management Clinic team.  Patient's case discussed at weekly meeting on 5/8/24.    New interventions/additional follow up per this discussion include:      1.  Some weight loss this week, despite good PO intake      2.  C6 upcoming next week      3.  Dr. SCHUSTER following, patient remains active      4.  RD f/v next week       The patient will continue to be followed by Symptom Management Clinic team.  
N/A

## 2024-11-04 NOTE — OB PROVIDER TRIAGE NOTE - NS_EDDCALCULATED_OBGYN_ALL_OB
LMP CHIEF COMPLAINT:    HISTORY OF PRESENT ILLNESS:  89yo M with history of dementia (baseline AOx2), HTN, PVD and recent hospitalization for hydroureteronephrosis s/p L ureteral stent and antibiotics presented to [facility] with slurred speech and difficulty walking from baseline and found to have an acute on chronic L SDH (2.5cm) with MLS (1.2cm) for which he was transferred to [facility] where he underwent left craniotomy for SDH evacuation on 11/2/24. His ICU course has been notable for agitation requiring dexmedetomidine.     24 HOUR EVENTS:  Levetiracetam changed to VPA. Weaned off dexmedetomidine gtt in the day, but became agitated and with risk if harming self/staff, so placed back on dexmedetomidine gtt.       PAST MEDICAL & SURGICAL HISTORY:  Dementia      MEDICATIONS:  doxazosin 2 milliGRAM(s) Oral at bedtime  heparin   Injectable 5000 Unit(s) SubCutaneous every 8 hours    piperacillin/tazobactam IVPB.. 3.375 Gram(s) IV Intermittent every 8 hours      dexMEDEtomidine Infusion 0.2 MICROgram(s)/kG/Hr IV Continuous <Continuous>  memantine 5 milliGRAM(s) Oral two times a day  ondansetron Injectable 4 milliGRAM(s) IV Push every 6 hours PRN  oxyCODONE    IR 10 milliGRAM(s) Oral every 4 hours PRN  oxyCODONE    IR 5 milliGRAM(s) Oral every 4 hours PRN  traZODone 100 milliGRAM(s) Oral <User Schedule>  valproate sodium   IVPB 250 milliGRAM(s) IV Intermittent every 8 hours    polyethylene glycol 3350 17 Gram(s) Oral daily  senna 2 Tablet(s) Oral at bedtime    atorvastatin 10 milliGRAM(s) Oral at bedtime    chlorhexidine 4% Liquid 1 Application(s) Topical daily  multivitamin/minerals/iron Oral Solution (CENTRUM) 15 milliLiter(s) Oral daily  thiamine Injectable 100 milliGRAM(s) IV Push daily      FAMILY HISTORY:      SOCIAL HISTORY:    [ ] Non-smoker  [ ] Smoker  [ ] Alcohol    Allergies    No Known Allergies    Intolerances    	    REVIEW OF SYSTEMS:  CONSTITUTIONAL: No fever, weight loss, + fatigue  EYES: No eye pain, visual disturbances, or discharge  ENMT:  No difficulty hearing, tinnitus, vertigo; No sinus or throat pain  NECK: No pain or stiffness  RESPIRATORY: No cough, wheezing, chills or hemoptysis; No Shortness of Breath  CARDIOVASCULAR: No chest pain, palpitations, passing out, dizziness, or leg swelling  GASTROINTESTINAL: No abdominal or epigastric pain. No nausea, vomiting, or hematemesis; No diarrhea or constipation. No melena or hematochezia.  GENITOURINARY: No dysuria, frequency, hematuria, or incontinence  NEUROLOGICAL: No headaches, memory loss, loss of strength, numbness, or tremors  SKIN: No itching, burning, rashes, or lesions   LYMPH Nodes: No enlarged glands  ENDOCRINE: No heat or cold intolerance; No hair loss  MUSCULOSKELETAL: No joint pain or swelling; No muscle, back, or extremity pain  PSYCHIATRIC: No depression, anxiety, mood swings, or difficulty sleeping  HEME/LYMPH: No easy bruising, or bleeding gums  ALLERY AND IMMUNOLOGIC: No hives or eczema	    [ ] All others negative	  [ ] Unable to obtain    PHYSICAL EXAM:  T(C): 36.8 (11-04-24 @ 15:00), Max: 38 (11-04-24 @ 03:00)  HR: 66 (11-04-24 @ 15:00) (52 - 93)  BP: 145/78 (11-04-24 @ 15:00) (113/65 - 151/60)  RR: 19 (11-04-24 @ 15:00) (15 - 28)  SpO2: 100% (11-04-24 @ 15:00) (96% - 100%)  Wt(kg): --  I&O's Summary    03 Nov 2024 07:01  -  04 Nov 2024 07:00  --------------------------------------------------------  IN: 633.8 mL / OUT: 1540 mL / NET: -906.2 mL    04 Nov 2024 07:01  -  04 Nov 2024 18:02  --------------------------------------------------------  IN: 545.5 mL / OUT: 400 mL / NET: 145.5 mL        Appearance: NAD	  HEENT:  Dry  oral mucosa, PERRL, EOMI	  Lymphatic: No lymphadenopathy  Cardiovascular: Normal S1 S2, No JVD, +murmurs, No edema  Respiratory: Lungs clear to auscultation	  Psychiatry: A & O x 1  Gastrointestinal:  Soft, Non-tender, + BS	  Skin: No rashes, No ecchymoses, No cyanosis	  Neurologic: Awake, alert, fully oriented, follows commands, PERRL, VFFtc, EOMI, face symmetric, tongue midline, no drift, full strength  Neuro: A&O x 1, follows commands with coaxing, PERRL, EOMI, no facial, BUE 4+/5, RUE drift, BLE 4/5    Extremities: Normal range of motion, No clubbing, cyanosis or edema  Vascular: Peripheral pulses palpable 2+ bilaterally    TELEMETRY: 	SR    ECG:  	NSR PACs  RADIOLOGY:  < from: CT Head No Cont (11.04.24 @ 08:59) >    ACC: [id number] EXAM:  CT BRAIN   ORDERED BY: KUSUM PARRISH     PROCEDURE DATE:  11/04/2024          INTERPRETATION:  CLINICAL INFORMATION: interval scan      5mm axial sections of the brain were obtained from base to vertex,   without the intravenous administration of contrast material. Images were   obtained on a portable CT scanner and compared with 11/3/2024.      There has been a left frontal craniotomy. There is air in the subgaleal   space. There is residual mixed density subdural collection with air   unchanged since the prior exam. There is residual mass effect on the   cerebral cortical sulci and the left lateral ventricle without   significant midline shift to the right. Previous bilateral lens   replacement surgery.    IMPRESSION: No change since 11/3/2024. Left frontal craniotomy. Residual   mixed density subdural collection and air.    --- End of Report ---            NORMA NOONAN MD; Attending Radiologist  This document has been electronically signed. Nov 4 2024  9:18AM    < end of copied text >  < from: CT Head No Cont (11.03.24 @ 09:20) >    ACC: [id number] EXAM:  CT BRAIN   ORDERED BY: ALLAN WRAY     PROCEDURE DATE:  11/03/2024          INTERPRETATION:  CLINICAL INFORMATION: post op SDH evac    COMPARISON: CT head 11/2/2024    CONTRAST:  IV Contrast: NONE  Complications: None reported at time of study completion    TECHNIQUE:  Serial axial images were obtained from the skull base to the   vertex using multi-slice helical technique. Images were obtained on a   portable CT scanner    FINDINGS:  Limited evaluation secondary to streak artifact.    VENTRICLES AND SULCI: Slight decrease in mass effect on the left lateral   ventricle compared to prior exam. Age appropriate involutional changes.  INTRA-AXIAL: Chronic left basal ganglia lacunar infarct. Decreased   rightward midline shift measuring 5 mm, previously 8 mm.  EXTRA-AXIAL: Status post evacuation of holohemispheric left subdural   hematoma with decrease in residual hemorrhage. Extensive pneumocephalus   is unchanged.    VISUALIZED SINUSES:  Left maxillary sinus polyp orretention cyst.  TYMPANOMASTOID CAVITIES:  Clear.  VISUALIZED ORBITS: Bilateral lens replacement.  CALVARIUM: Left frontal craniotomy.    MISCELLANEOUS: Extracalvarial air and fluid collection in the left   temporal region measuring 6 mm in thicknesswith extracalvarial drain in   place, not significantly changed from prior exam (1-13).      IMPRESSION:  Status post evacuation of holohemispheric left subdural hematoma with   decrease in residual hemorrhage and air compared with 11/2/2024.  Decreased rightward midline shift measuring 5 mm.        --- End of Report ---           CINDI CARDONA MD; Resident Radiologist  This document has been electronically signed.  NORMA NOONAN MD; Attending Radiologist  This document has been electronically signed. Nov  3 2024 11:09AM    < end of copied text >  < from: Xray Chest 1 View- PORTABLE-Urgent (Xray Chest 1 View- PORTABLE-Urgent .) (11.03.24 @ 11:33) >    ACC: [id number] EXAM:  XR CHEST PORTABLE URGENT 1V   ORDERED BY: BONNIE JACQUES     PROCEDURE DATE:  11/03/2024          INTERPRETATION:  EXAMINATION: XR CHEST URGENT    CLINICAL INDICATION: Assess NG tube placement.    TECHNIQUE: Single frontal, portable view of the chest was obtained.    COMPARISON: Chest x-ray 11/2/2024.    FINDINGS:  Enteric tube is noted with the distal tip at the level of the GE   junction. Repositioning is recommended.  The heart is difficult to evaluate. Patient is status post median   sternotomy and coronary artery bypass graft..  Bilateral hazy opacities, mildly decreased compared to prior.    IMPRESSION:  Nasogastric tube is noted with the distal tip at the level of the GE   junction. Repositioning is recommended. Bilateral hazy opacities, mildly   decreased compared to prior study.    --- End of Report ---    < end of copied text >  < from: TTE Limited W or WO Ultrasound Enhancing Agent (11.03.24 @ 09:35) >    TRANSTHORACIC ECHOCARDIOGRAM REPORT  ________________________________________________________________________________                                      _______       Pt. Name:       TERRI CARRIZALES Study Date:    11/3/2024  MRN:            [id number]   YOB: 1924  Accession #:    [id number]    Age:           89 years  Account#:       [id number] Gender:        M  Heart Rate:                  Height:        70.00 in (177.80 cm)  Rhythm:                      Weight:        164.00 lb(74.39 kg)  Blood Pressure: 114/58 mmHg  BSA/BMI:       1.92 m² / 23.53 kg/m²  ________________________________________________________________________________________  Referring Physician:    [id number] Ulises Godfrey  Interpreting Physician: Gifty Guaman  Primary Sonographer:    Mitchell Wright [facility]    CPT:               ECHO TTE WO CON COMP W DOPP - 04443.m  Indication(s):     Cardiomyopathy, unspecified - I42.9  Procedure:         Transthoracic echocardiogram with 2-D, M-mode and complete                     spectral and color flow Doppler.  Ordering Location: [facility] [place]  Admission Status:  Inpatient  Study Information: Image quality for this study is adequate.    _______________________________________________________________________________________     CONCLUSIONS:      1. Left ventricular systolic function is mildly to moderately decreased with an ejection fraction visually estimated at 40 to 45 %. Regional wall motion abnormalities present.   2. There is moderate (grade 2) left ventricular diastolic dysfunction, with elevated left ventricular filling pressure.   3. Left atrium is mildly dilated.   4. Moderate mitral regurgitation.   5. Aortic root at the sinuses of Valsalva is dilated, measuring 3.80 cm (indexed 1.98 cm/m²).  6. Bilateral pleural effusion noted.   7. Estimated pulmonary artery systolic pressure is 44 mmHg, consistent with elevated pulmonary artery pressure.      < end of copied text >    OTHER: 	  	  LABS:	 	    CARDIAC MARKERS:                                  8.2    6.18  )-----------( 163      ( 04 Nov 2024 00:28 )             26.0     11-04    139  |  108  |  33[H]  ----------------------------<  82  4.0   |  22  |  0.92    Ca    7.8[L]      04 Nov 2024 00:22  Phos  2.7     11-04  Mg     2.2     11-04      proBNP:   Lipid Profile:   HgA1c:   TSH:            CHIEF COMPLAINT:    HISTORY OF PRESENT ILLNESS:  89yo M with history of dementia (baseline AOx2), HTN, PVD and recent hospitalization for hydroureteronephrosis s/p L ureteral stent and antibiotics presented to [facility] with slurred speech and difficulty walking from baseline and found to have an acute on chronic L SDH (2.5cm) with MLS (1.2cm) for which he was transferred to [facility] where he underwent left craniotomy for SDH evacuation on 11/2/24. His ICU course has been notable for agitation requiring dexmedetomidine.     24 HOUR EVENTS:  Levetiracetam changed to VPA. Weaned off dexmedetomidine gtt in the day, but became agitated and with risk if harming self/staff, so placed back on dexmedetomidine gtt.       PAST MEDICAL & SURGICAL HISTORY:  Dementia      MEDICATIONS:  doxazosin 2 milliGRAM(s) Oral at bedtime  heparin   Injectable 5000 Unit(s) SubCutaneous every 8 hours    piperacillin/tazobactam IVPB.. 3.375 Gram(s) IV Intermittent every 8 hours      dexMEDEtomidine Infusion 0.2 MICROgram(s)/kG/Hr IV Continuous <Continuous>  memantine 5 milliGRAM(s) Oral two times a day  ondansetron Injectable 4 milliGRAM(s) IV Push every 6 hours PRN  oxyCODONE    IR 10 milliGRAM(s) Oral every 4 hours PRN  oxyCODONE    IR 5 milliGRAM(s) Oral every 4 hours PRN  traZODone 100 milliGRAM(s) Oral <User Schedule>  valproate sodium   IVPB 250 milliGRAM(s) IV Intermittent every 8 hours    polyethylene glycol 3350 17 Gram(s) Oral daily  senna 2 Tablet(s) Oral at bedtime    atorvastatin 10 milliGRAM(s) Oral at bedtime    chlorhexidine 4% Liquid 1 Application(s) Topical daily  multivitamin/minerals/iron Oral Solution (CENTRUM) 15 milliLiter(s) Oral daily  thiamine Injectable 100 milliGRAM(s) IV Push daily      FAMILY HISTORY:      SOCIAL HISTORY:    [ ] Non-smoker  [ ] Smoker  [ ] Alcohol    Allergies    No Known Allergies    Intolerances    	    REVIEW OF SYSTEMS:  CONSTITUTIONAL: No fever, weight loss, + fatigue  EYES: No eye pain, visual disturbances, or discharge  ENMT:  No difficulty hearing, tinnitus, vertigo; No sinus or throat pain  NECK: No pain or stiffness  RESPIRATORY: No cough, wheezing, chills or hemoptysis; No Shortness of Breath  CARDIOVASCULAR: No chest pain, palpitations, passing out, dizziness, or leg swelling  GASTROINTESTINAL: No abdominal or epigastric pain. No nausea, vomiting, or hematemesis; No diarrhea or constipation. No melena or hematochezia.  GENITOURINARY: No dysuria, frequency, hematuria, or incontinence  NEUROLOGICAL: No headaches, memory loss, loss of strength, numbness, or tremors  SKIN: No itching, burning, rashes, or lesions   LYMPH Nodes: No enlarged glands  ENDOCRINE: No heat or cold intolerance; No hair loss  MUSCULOSKELETAL: No joint pain or swelling; No muscle, back, or extremity pain  PSYCHIATRIC: No depression, anxiety, mood swings, or difficulty sleeping  HEME/LYMPH: No easy bruising, or bleeding gums  ALLERY AND IMMUNOLOGIC: No hives or eczema	    [ ] All others negative	  [ ] Unable to obtain    PHYSICAL EXAM:  T(C): 36.8 (11-04-24 @ 15:00), Max: 38 (11-04-24 @ 03:00)  HR: 66 (11-04-24 @ 15:00) (52 - 93)  BP: 145/78 (11-04-24 @ 15:00) (113/65 - 151/60)  RR: 19 (11-04-24 @ 15:00) (15 - 28)  SpO2: 100% (11-04-24 @ 15:00) (96% - 100%)  Wt(kg): --  I&O's Summary    03 Nov 2024 07:01  -  04 Nov 2024 07:00  --------------------------------------------------------  IN: 633.8 mL / OUT: 1540 mL / NET: -906.2 mL    04 Nov 2024 07:01  -  04 Nov 2024 18:02  --------------------------------------------------------  IN: 545.5 mL / OUT: 400 mL / NET: 145.5 mL        Appearance: NAD	+NGT   HEENT:  Dry  oral mucosa, PERRL, EOMI	  Lymphatic: No lymphadenopathy  Cardiovascular: Normal S1 S2, No JVD, +murmurs, No edema  Respiratory: Lungs clear to auscultation	  Psychiatry: A & O x 1  Gastrointestinal:  Soft, Non-tender, + BS	  Skin: No rashes, No ecchymoses, No cyanosis	  Neurologic: Awake, alert, fully oriented, follows commands, PERRL, VFFtc, EOMI, face symmetric, tongue midline, no drift, full strength  Neuro: A&O x 1, follows commands with coaxing, PERRL, EOMI, no facial, BUE 4+/5, RUE drift, BLE 4/5    Extremities: Normal range of motion, No clubbing, cyanosis or edema  Vascular: Peripheral pulses palpable 2+ bilaterally    TELEMETRY: 	SR    ECG:  	NSR PACs  RADIOLOGY:  < from: CT Head No Cont (11.04.24 @ 08:59) >    ACC: [id number] EXAM:  CT BRAIN   ORDERED BY: KUSUM PARRISH     PROCEDURE DATE:  11/04/2024          INTERPRETATION:  CLINICAL INFORMATION: interval scan      5mm axial sections of the brain were obtained from base to vertex,   without the intravenous administration of contrast material. Images were   obtained on a portable CT scanner and compared with 11/3/2024.      There has been a left frontal craniotomy. There is air in the subgaleal   space. There is residual mixed density subdural collection with air   unchanged since the prior exam. There is residual mass effect on the   cerebral cortical sulci and the left lateral ventricle without   significant midline shift to the right. Previous bilateral lens   replacement surgery.    IMPRESSION: No change since 11/3/2024. Left frontal craniotomy. Residual   mixed density subdural collection and air.    --- End of Report ---            NORMA NOONAN MD; Attending Radiologist  This document has been electronically signed. Nov 4 2024  9:18AM    < end of copied text >  < from: CT Head No Cont (11.03.24 @ 09:20) >    ACC: [id number] EXAM:  CT BRAIN   ORDERED BY: ALLAN WRAY     PROCEDURE DATE:  11/03/2024          INTERPRETATION:  CLINICAL INFORMATION: post op SDH evac    COMPARISON: CT head 11/2/2024    CONTRAST:  IV Contrast: NONE  Complications: None reported at time of study completion    TECHNIQUE:  Serial axial images were obtained from the skull base to the   vertex using multi-slice helical technique. Images were obtained on a   portable CT scanner    FINDINGS:  Limited evaluation secondary to streak artifact.    VENTRICLES AND SULCI: Slight decrease in mass effect on the left lateral   ventricle compared to prior exam. Age appropriate involutional changes.  INTRA-AXIAL: Chronic left basal ganglia lacunar infarct. Decreased   rightward midline shift measuring 5 mm, previously 8 mm.  EXTRA-AXIAL: Status post evacuation of holohemispheric left subdural   hematoma with decrease in residual hemorrhage. Extensive pneumocephalus   is unchanged.    VISUALIZED SINUSES:  Left maxillary sinus polyp orretention cyst.  TYMPANOMASTOID CAVITIES:  Clear.  VISUALIZED ORBITS: Bilateral lens replacement.  CALVARIUM: Left frontal craniotomy.    MISCELLANEOUS: Extracalvarial air and fluid collection in the left   temporal region measuring 6 mm in thicknesswith extracalvarial drain in   place, not significantly changed from prior exam (1-13).      IMPRESSION:  Status post evacuation of holohemispheric left subdural hematoma with   decrease in residual hemorrhage and air compared with 11/2/2024.  Decreased rightward midline shift measuring 5 mm.        --- End of Report ---           CINDI CARDONA MD; Resident Radiologist  This document has been electronically signed.  NORMA NOONAN MD; Attending Radiologist  This document has been electronically signed. Nov  3 2024 11:09AM    < end of copied text >  < from: Xray Chest 1 View- PORTABLE-Urgent (Xray Chest 1 View- PORTABLE-Urgent .) (11.03.24 @ 11:33) >    ACC: [id number] EXAM:  XR CHEST PORTABLE URGENT 1V   ORDERED BY: BONNIE JACQUES     PROCEDURE DATE:  11/03/2024          INTERPRETATION:  EXAMINATION: XR CHEST URGENT    CLINICAL INDICATION: Assess NG tube placement.    TECHNIQUE: Single frontal, portable view of the chest was obtained.    COMPARISON: Chest x-ray 11/2/2024.    FINDINGS:  Enteric tube is noted with the distal tip at the level of the GE   junction. Repositioning is recommended.  The heart is difficult to evaluate. Patient is status post median   sternotomy and coronary artery bypass graft..  Bilateral hazy opacities, mildly decreased compared to prior.    IMPRESSION:  Nasogastric tube is noted with the distal tip at the level of the GE   junction. Repositioning is recommended. Bilateral hazy opacities, mildly   decreased compared to prior study.    --- End of Report ---    < end of copied text >  < from: TTE Limited W or WO Ultrasound Enhancing Agent (11.03.24 @ 09:35) >    TRANSTHORACIC ECHOCARDIOGRAM REPORT  ________________________________________________________________________________                                      _______       Pt. Name:       TERRI CARRIZALES Study Date:    11/3/2024  MRN:            [id number]   YOB: 1924  Accession #:    [id number]    Age:           89 years  Account#:       [id number] Gender:        M  Heart Rate:                  Height:        70.00 in (177.80 cm)  Rhythm:                      Weight:        164.00 lb(74.39 kg)  Blood Pressure: 114/58 mmHg  BSA/BMI:       1.92 m² / 23.53 kg/m²  ________________________________________________________________________________________  Referring Physician:    [id number] Ulises Godfrey  Interpreting Physician: Gifty Guaman  Primary Sonographer:    Mitchell Wright [facility]    CPT:               ECHO TTE WO CON COMP W DOPP - 36557.m  Indication(s):     Cardiomyopathy, unspecified - I42.9  Procedure:         Transthoracic echocardiogram with 2-D, M-mode and complete                     spectral and color flow Doppler.  Ordering Location: [facility] [place]  Admission Status:  Inpatient  Study Information: Image quality for this study is adequate.    _______________________________________________________________________________________     CONCLUSIONS:      1. Left ventricular systolic function is mildly to moderately decreased with an ejection fraction visually estimated at 40 to 45 %. Regional wall motion abnormalities present.   2. There is moderate (grade 2) left ventricular diastolic dysfunction, with elevated left ventricular filling pressure.   3. Left atrium is mildly dilated.   4. Moderate mitral regurgitation.   5. Aortic root at the sinuses of Valsalva is dilated, measuring 3.80 cm (indexed 1.98 cm/m²).  6. Bilateral pleural effusion noted.   7. Estimated pulmonary artery systolic pressure is 44 mmHg, consistent with elevated pulmonary artery pressure.      < end of copied text >    OTHER: 	  	  LABS:	 	    CARDIAC MARKERS:                                  8.2    6.18  )-----------( 163      ( 04 Nov 2024 00:28 )             26.0     11-04    139  |  108  |  33[H]  ----------------------------<  82  4.0   |  22  |  0.92    Ca    7.8[L]      04 Nov 2024 00:22  Phos  2.7     11-04  Mg     2.2     11-04      proBNP:   Lipid Profile:   HgA1c:   TSH:

## 2024-11-13 NOTE — PHYSICAL EXAM
Subjective:       Jelena Bay is a 59 y.o. female who presents for evaluation of umbilical hernia.She reports she has had it for a couple of months. She reports a huge knot in her left inguinal region and umbilical hernia. She had a epigastric ventral hernia repair with mesh. Tolerating a diet and reports constipation. Last BM was this am. She used to drink a lot of ETOH (12 pack per day) - she quit 1 year ago. Smokes 1 ppd.       9/17/24: CTAP: FINDINGS:  A small supraumbilical ventral hernia contains only fat. A small to moderate  infraumbilical ventral hernia contains a nonobstructed knuckle of small bowel.  There is a small volume of chronic fat necrosis deep and to the left of the  infraumbilical hernia (601-35, 3-50).    Past Medical History:   Diagnosis Date    Anxiety     Arthritis     At risk for sleep apnea 04/20/2022    AMALIA score during PAT call was -4    Bipolar disorder (HCC)     sees psychiatrist    Chronic back pain     Chronic pain     Depression     GERD (gastroesophageal reflux disease)     Headache     Hypertension     Osteoarthritis     Other ill-defined conditions(799.89)     Psychiatric disorder     Psychotic disorder (HCC)     Small bowel obstruction (HCC)     Tendonitis     right arm     Past Surgical History:   Procedure Laterality Date    APPENDECTOMY      BLADDER SUSPENSION      FRACTURE SURGERY      HERNIA REPAIR      HYSTERECTOMY, TOTAL ABDOMINAL (CERVIX REMOVED)      ORTHOPEDIC SURGERY      feet -paltes and screws    OTHER SURGICAL HISTORY  05/09/2022    EXCISION 10CM RIGHT AXILLARY MASS     OTHER SURGICAL HISTORY  05/09/2022    Excision of right axillary mass 10 cm    SMALL INTESTINE SURGERY      TUBAL LIGATION      US DRAIN SOFT TISSUE ABSCESS  05/23/2022    US DRAIN SOFT TISSUE ABSCESS 5/23/2022 [facility] RAD US     Social History     Socioeconomic History    Marital status:      Spouse name: None    Number of children: None    Years of education: None    Highest education level:  [Chaperone Present] : A chaperone was present in the examining room during all aspects of the physical examination [FreeTextEntry1] : partner in for consult and exam [Appropriately responsive] : appropriately responsive [Alert] : alert [No Acute Distress] : no acute distress [No Lymphadenopathy] : no lymphadenopathy [Regular Rate Rhythm] : regular rate rhythm [No Murmurs] : no murmurs [Clear to Auscultation B/L] : clear to auscultation bilaterally [Soft] : soft [Non-tender] : non-tender [Non-distended] : non-distended [No HSM] : No HSM [No Lesions] : no lesions [No Mass] : no mass [Oriented x3] : oriented x3 [Examination Of The Breasts] : a normal appearance [No Masses] : no breast masses were palpable [Labia Majora] : normal [Labia Minora] : normal [Normal] : normal [Uterine Adnexae] : normal

## 2025-03-18 ENCOUNTER — APPOINTMENT (OUTPATIENT)
Dept: ORTHOPEDIC SURGERY | Facility: CLINIC | Age: 35
End: 2025-03-18

## 2025-05-20 NOTE — OB RN INTRAOPERATIVE NOTE - NS_DELIVERYANESTHES_OBGYN_ALL_OB_FT
"Pt reports improvement through irrigation procedure. Notes: \"I could really hear after the [the second round of passes with the catheter syringe].\"   Irrigated with 2 L of warm water : hyrogen peroxide (9:1). ~1200 mL in right ear. ~700 mL in left ear.   " palleschi

## 2025-07-24 ENCOUNTER — APPOINTMENT (OUTPATIENT)
Dept: OBGYN | Facility: CLINIC | Age: 35
End: 2025-07-24